# Patient Record
Sex: MALE | Race: WHITE | NOT HISPANIC OR LATINO | Employment: UNEMPLOYED | URBAN - METROPOLITAN AREA
[De-identification: names, ages, dates, MRNs, and addresses within clinical notes are randomized per-mention and may not be internally consistent; named-entity substitution may affect disease eponyms.]

---

## 2018-04-08 ENCOUNTER — HOSPITAL ENCOUNTER (INPATIENT)
Facility: HOSPITAL | Age: 33
LOS: 1 days | Discharge: HOME | DRG: 871 | End: 2018-04-09
Attending: EMERGENCY MEDICINE | Admitting: INTERNAL MEDICINE
Payer: OTHER GOVERNMENT

## 2018-04-08 DIAGNOSIS — R05.9 COUGH: ICD-10-CM

## 2018-04-08 DIAGNOSIS — J18.9 PNEUMONIA OF RIGHT LOWER LOBE DUE TO INFECTIOUS ORGANISM: Primary | ICD-10-CM

## 2018-04-08 DIAGNOSIS — A41.9 SEPSIS, DUE TO UNSPECIFIED ORGANISM: ICD-10-CM

## 2018-04-08 PROBLEM — F41.9 ANXIETY: Status: ACTIVE | Noted: 2018-04-08

## 2018-04-08 LAB
ALBUMIN SERPL BCP-MCNC: 4.3 G/DL
ALP SERPL-CCNC: 57 U/L
ALT SERPL W/O P-5'-P-CCNC: 11 U/L
ANION GAP SERPL CALC-SCNC: 14 MMOL/L
AST SERPL-CCNC: 16 U/L
BASOPHILS # BLD AUTO: 0.01 K/UL
BASOPHILS NFR BLD: 0.1 %
BILIRUB SERPL-MCNC: 2 MG/DL
BUN SERPL-MCNC: 10 MG/DL
CALCIUM SERPL-MCNC: 9.4 MG/DL
CHLORIDE SERPL-SCNC: 102 MMOL/L
CO2 SERPL-SCNC: 20 MMOL/L
CREAT SERPL-MCNC: 1.3 MG/DL
DIFFERENTIAL METHOD: ABNORMAL
EOSINOPHIL # BLD AUTO: 0.1 K/UL
EOSINOPHIL NFR BLD: 0.5 %
ERYTHROCYTE [DISTWIDTH] IN BLOOD BY AUTOMATED COUNT: 12.8 %
EST. GFR  (AFRICAN AMERICAN): >60 ML/MIN/1.73 M^2
EST. GFR  (NON AFRICAN AMERICAN): >60 ML/MIN/1.73 M^2
ETHANOL SERPL-MCNC: <10 MG/DL
GLUCOSE SERPL-MCNC: 162 MG/DL
HCT VFR BLD AUTO: 46.8 %
HGB BLD-MCNC: 16.8 G/DL
LACTATE SERPL-SCNC: 1.4 MMOL/L
LACTATE SERPL-SCNC: 3.4 MMOL/L
LYMPHOCYTES # BLD AUTO: 0.6 K/UL
LYMPHOCYTES NFR BLD: 6.6 %
MAGNESIUM SERPL-MCNC: 2.1 MG/DL
MCH RBC QN AUTO: 33 PG
MCHC RBC AUTO-ENTMCNC: 35.9 G/DL
MCV RBC AUTO: 92 FL
MONOCYTES # BLD AUTO: 0.4 K/UL
MONOCYTES NFR BLD: 4.5 %
NEUTROPHILS # BLD AUTO: 8.1 K/UL
NEUTROPHILS NFR BLD: 88.3 %
PHOSPHATE SERPL-MCNC: 1.4 MG/DL
PLATELET # BLD AUTO: 100 K/UL
PMV BLD AUTO: 10.8 FL
POTASSIUM SERPL-SCNC: 3.9 MMOL/L
PROT SERPL-MCNC: 7.7 G/DL
RBC # BLD AUTO: 5.09 M/UL
SODIUM SERPL-SCNC: 136 MMOL/L
WBC # BLD AUTO: 9.21 K/UL

## 2018-04-08 PROCEDURE — 84100 ASSAY OF PHOSPHORUS: CPT

## 2018-04-08 PROCEDURE — 83735 ASSAY OF MAGNESIUM: CPT

## 2018-04-08 PROCEDURE — 96361 HYDRATE IV INFUSION ADD-ON: CPT

## 2018-04-08 PROCEDURE — 94640 AIRWAY INHALATION TREATMENT: CPT

## 2018-04-08 PROCEDURE — 25000242 PHARM REV CODE 250 ALT 637 W/ HCPCS: Performed by: NURSE PRACTITIONER

## 2018-04-08 PROCEDURE — 96365 THER/PROPH/DIAG IV INF INIT: CPT

## 2018-04-08 PROCEDURE — 99285 EMERGENCY DEPT VISIT HI MDM: CPT | Mod: 25

## 2018-04-08 PROCEDURE — 25000003 PHARM REV CODE 250: Performed by: NURSE PRACTITIONER

## 2018-04-08 PROCEDURE — 83605 ASSAY OF LACTIC ACID: CPT | Mod: 91

## 2018-04-08 PROCEDURE — 25000003 PHARM REV CODE 250: Performed by: INTERNAL MEDICINE

## 2018-04-08 PROCEDURE — 25000003 PHARM REV CODE 250: Performed by: EMERGENCY MEDICINE

## 2018-04-08 PROCEDURE — 87040 BLOOD CULTURE FOR BACTERIA: CPT | Mod: 59

## 2018-04-08 PROCEDURE — 63600175 PHARM REV CODE 636 W HCPCS: Performed by: EMERGENCY MEDICINE

## 2018-04-08 PROCEDURE — 63600175 PHARM REV CODE 636 W HCPCS: Performed by: NURSE PRACTITIONER

## 2018-04-08 PROCEDURE — 85025 COMPLETE CBC W/AUTO DIFF WBC: CPT

## 2018-04-08 PROCEDURE — 21400001 HC TELEMETRY ROOM

## 2018-04-08 PROCEDURE — 96375 TX/PRO/DX INJ NEW DRUG ADDON: CPT

## 2018-04-08 PROCEDURE — 80053 COMPREHEN METABOLIC PANEL: CPT

## 2018-04-08 PROCEDURE — 80320 DRUG SCREEN QUANTALCOHOLS: CPT

## 2018-04-08 RX ORDER — IBUPROFEN 400 MG/1
400 TABLET ORAL EVERY 6 HOURS PRN
Status: DISCONTINUED | OUTPATIENT
Start: 2018-04-08 | End: 2018-04-09 | Stop reason: HOSPADM

## 2018-04-08 RX ORDER — ONDANSETRON 8 MG/1
8 TABLET, ORALLY DISINTEGRATING ORAL EVERY 8 HOURS PRN
Status: DISCONTINUED | OUTPATIENT
Start: 2018-04-08 | End: 2018-04-09 | Stop reason: HOSPADM

## 2018-04-08 RX ORDER — CLONAZEPAM 1 MG/1
1 TABLET ORAL 2 TIMES DAILY PRN
Status: DISCONTINUED | OUTPATIENT
Start: 2018-04-08 | End: 2018-04-09 | Stop reason: HOSPADM

## 2018-04-08 RX ORDER — IPRATROPIUM BROMIDE AND ALBUTEROL SULFATE 2.5; .5 MG/3ML; MG/3ML
3 SOLUTION RESPIRATORY (INHALATION) EVERY 4 HOURS PRN
Status: DISCONTINUED | OUTPATIENT
Start: 2018-04-08 | End: 2018-04-09 | Stop reason: HOSPADM

## 2018-04-08 RX ORDER — SODIUM CHLORIDE 0.9 % (FLUSH) 0.9 %
5 SYRINGE (ML) INJECTION
Status: DISCONTINUED | OUTPATIENT
Start: 2018-04-08 | End: 2018-04-09 | Stop reason: HOSPADM

## 2018-04-08 RX ORDER — PANTOPRAZOLE SODIUM 40 MG/1
40 TABLET, DELAYED RELEASE ORAL DAILY
Status: DISCONTINUED | OUTPATIENT
Start: 2018-04-09 | End: 2018-04-09 | Stop reason: HOSPADM

## 2018-04-08 RX ORDER — BENZONATATE 100 MG/1
100 CAPSULE ORAL 3 TIMES DAILY PRN
Status: DISCONTINUED | OUTPATIENT
Start: 2018-04-08 | End: 2018-04-09 | Stop reason: HOSPADM

## 2018-04-08 RX ORDER — ACETAMINOPHEN 325 MG/1
650 TABLET ORAL EVERY 4 HOURS PRN
Status: DISCONTINUED | OUTPATIENT
Start: 2018-04-08 | End: 2018-04-09 | Stop reason: HOSPADM

## 2018-04-08 RX ORDER — MOXIFLOXACIN HYDROCHLORIDE 400 MG/1
400 TABLET ORAL
Status: DISCONTINUED | OUTPATIENT
Start: 2018-04-08 | End: 2018-04-08

## 2018-04-08 RX ORDER — CEFTRIAXONE 1 G/1
1 INJECTION, POWDER, FOR SOLUTION INTRAMUSCULAR; INTRAVENOUS
Status: DISCONTINUED | OUTPATIENT
Start: 2018-04-08 | End: 2018-04-08

## 2018-04-08 RX ADMIN — ACETAMINOPHEN 650 MG: 325 TABLET ORAL at 09:04

## 2018-04-08 RX ADMIN — CLONAZEPAM 1 MG: 1 TABLET ORAL at 09:04

## 2018-04-08 RX ADMIN — BENZONATATE 100 MG: 100 CAPSULE ORAL at 04:04

## 2018-04-08 RX ADMIN — AZITHROMYCIN MONOHYDRATE 500 MG: 500 INJECTION, POWDER, LYOPHILIZED, FOR SOLUTION INTRAVENOUS at 03:04

## 2018-04-08 RX ADMIN — SODIUM CHLORIDE 1700 ML: 0.9 INJECTION, SOLUTION INTRAVENOUS at 02:04

## 2018-04-08 RX ADMIN — MOXIFLOXACIN HYDROCHLORIDE 400 MG: 400 TABLET, FILM COATED ORAL at 02:04

## 2018-04-08 RX ADMIN — SODIUM CHLORIDE 1000 ML: 0.9 INJECTION, SOLUTION INTRAVENOUS at 01:04

## 2018-04-08 RX ADMIN — CEFTRIAXONE SODIUM 1 G: 1 INJECTION, POWDER, FOR SOLUTION INTRAMUSCULAR; INTRAVENOUS at 04:04

## 2018-04-08 RX ADMIN — IPRATROPIUM BROMIDE AND ALBUTEROL SULFATE 3 ML: .5; 3 SOLUTION RESPIRATORY (INHALATION) at 04:04

## 2018-04-08 RX ADMIN — IBUPROFEN 400 MG: 400 TABLET, FILM COATED ORAL at 03:04

## 2018-04-08 NOTE — H&P
[facility] Medicine  History & Physical    Patient Name: Calin Araya  MRN: [id number]  Admission Date: 4/8/2018  Attending Physician: Victor Manuel Guerra MD   Primary Care Provider: Provider [employer]         Patient information was obtained from patient and ER records.     Subjective:     Principal Problem:Pneumonia of right lower lobe due to infectious organism    Chief Complaint:   Chief Complaint   Patient presents with    Headache     x 1 week. reports double vision         HPI: Calin Araya is a 32 y.o. male patient  with history of PTSD and anxiety who presents to the Emergency Department for HA which onset gradually about a week ago. Associated symptoms include cough, rhinorrhea, blurry vision, congestion, n/v, sore throat, neck pain, and generalized myalgias. Patient denies any fever, neck stiffness, photophobia, extremity weakness/numbness, dizziness, rash, and trouble swallowing. ED work-up revealed tachycardia, tachypneic, lactic acid 3.4, CXR showed right lower lobe pneumonia, CT of head negative for acute findings. In the ED, sepsis protocol was initiated including IVF's. Patient admitted to inpatient for severe sepsis 2/2 right lower lobe PNA.     Past Medical History:   Diagnosis Date    Anxiety     PTSD (post-traumatic stress disorder)     PTSD (post-traumatic stress disorder)        Past Surgical History:   Procedure Laterality Date    CYST REMOVAL      right hand       Review of patient's allergies indicates:  No Known Allergies    No current facility-administered medications on file prior to encounter.      Current Outpatient Prescriptions on File Prior to Encounter   Medication Sig    clonazePAM (KLONOPIN) 1 MG tablet Take 1 mg by mouth 2 (two) times daily as needed for Anxiety.    pantoprazole (PROTONIX) 20 MG tablet Take 1 tablet (20 mg total) by mouth once daily.    promethazine (PHENERGAN) 25 MG tablet Take 1 tablet (25 mg total) by mouth every 6 (six)  hours as needed for Nausea.    propranolol (INDERAL) 10 MG tablet Take 10 mg by mouth 3 (three) times daily.    zolpidem (AMBIEN) 10 mg Tab Take 5 mg by mouth nightly as needed.     Family History     Problem Relation (Age of Onset)    Hypertension Father        Social History Main Topics    Smoking status: Never Smoker    Smokeless tobacco: Never Used    Alcohol use Yes    Drug use: No    Sexual activity: Yes     Partners: Female     Review of Systems   Constitutional: Negative.  Negative for activity change, appetite change, chills, fatigue and fever.   HENT: Positive for congestion, rhinorrhea and sore throat. Negative for trouble swallowing.    Eyes: Positive for visual disturbance (double vision ).   Respiratory: Positive for cough. Negative for shortness of breath.    Cardiovascular: Negative.  Negative for chest pain, palpitations and leg swelling.   Gastrointestinal: Positive for nausea and vomiting. Negative for abdominal pain.   Endocrine: Negative.    Genitourinary: Negative.  Negative for dysuria, flank pain, frequency and urgency.   Musculoskeletal: Positive for myalgias and neck pain. Negative for neck stiffness.   Skin: Negative.    Allergic/Immunologic: Negative.    Neurological: Positive for headaches. Negative for dizziness, weakness, light-headedness and numbness.   Hematological: Negative.    Psychiatric/Behavioral: Negative.      Objective:     Vital Signs (Most Recent):  Temp: 98.6 °F (37 °C) (04/08/18 1500)  Pulse: 100 (04/08/18 1500)  Resp: 20 (04/08/18 1500)  BP: 123/71 (04/08/18 1500)  SpO2: 99 % (04/08/18 1500) Vital Signs (24h Range):  Temp:  [98.6 °F (37 °C)-99.3 °F (37.4 °C)] 98.6 °F (37 °C)  Pulse:  [100-122] 100  Resp:  [20-26] 20  SpO2:  [97 %-99 %] 99 %  BP: (123-143)/(60-76) 123/71     Weight: 86.2 kg (190 lb)  Body mass index is 24.39 kg/m².    Physical Exam   Constitutional: He is oriented to person, place, and time. He appears well-developed and well-nourished.   HENT:    Head: Normocephalic and atraumatic.   Eyes: EOM are normal. Pupils are equal, round, and reactive to light.   Neck: Normal range of motion. Neck supple.   Cardiovascular: Regular rhythm, normal heart sounds, intact distal pulses and normal pulses.  Tachycardia present.    No murmur heard.  Pulmonary/Chest: Effort normal. No accessory muscle usage. No respiratory distress. Decreased breath sounds to right lower lobe.  Abdominal: Soft. Normal appearance and bowel sounds are normal. There is no tenderness.   Musculoskeletal: Normal range of motion.   Neurological: He is alert and oriented to person, place, and time. He has normal reflexes.   Skin: Skin is warm, dry and intact. Capillary refill takes less than 2 seconds.   Psychiatric: He has a normal mood and affect. His speech is normal and behavior is normal. Judgment and thought content normal. Cognition and memory are normal.   Nursing note and vitals reviewed.        CRANIAL NERVES     CN III, IV, VI   Pupils are equal, round, and reactive to light.  Extraocular motions are normal.        Significant Labs:   Blood Culture: No results for input(s): LABBLOO in the last 48 hours.  BMP:   Recent Labs  Lab 04/08/18  1303   *      K 3.9      CO2 20*   BUN 10   CREATININE 1.3   CALCIUM 9.4   MG 2.1     CBC:   Recent Labs  Lab 04/08/18  1303   WBC 9.21   HGB 16.8   HCT 46.8   *     CMP:   Recent Labs  Lab 04/08/18  1303      K 3.9      CO2 20*   *   BUN 10   CREATININE 1.3   CALCIUM 9.4   PROT 7.7   ALBUMIN 4.3   BILITOT 2.0*   ALKPHOS 57   AST 16   ALT 11   ANIONGAP 14   EGFRNONAA >60     All pertinent labs within the past 24 hours have been reviewed.    Significant Imaging:   Imaging Results          CT Head Without Contrast (Final result)  Result time 04/08/18 13:35:06    Final result by Victor Manuel Chaves MD (04/08/18 13:35:06)                 Impression:     No acute findings.    All CT scans at this facility use dose  modulation, iterative reconstruction, and/or weight based dosing when appropriate to reduce radiation dose to as low as reasonably achievable.      Electronically signed by: LEEANN BRADFORD MD  Date:     04/08/18  Time:    13:35              Narrative:    Exam: Noncontrast Ct scan of the Head    History: Headache    Findings: No intracranial hemorrhage or acute findings are demonstrated. The visualized paranasal sinuses are clear. The calvarium is intact.                             X-Ray Chest PA And Lateral (Final result)  Result time 04/08/18 13:35:37    Final result by Leeann Bradford MD (04/08/18 13:35:37)                 Impression:     Right lower lobe pneumonia.      Electronically signed by: LEEANN BRADFORD MD  Date:     04/08/18  Time:    13:35              Narrative:    Exam: Two-view chest xray    History:    Cough    Findings:     The heart is normal in size.  The left lung is clear.  Right lower lobe pneumonia.                            Assessment/Plan:     * Pneumonia of right lower lobe due to infectious organism    Tachycardia, tachypneic, Lactic acid 3.4  2/2 Right lower lobe PNA   Sepsis protocol including IVF's initiated in the ED  Blood cx pending  IV Rocephin and Azithromycin   Trend Lactic acid   Duonebs prn             Anxiety    Resume Klonopin prn             VTE Risk Mitigation         Ordered     IP VTE LOW RISK PATIENT  Once      04/08/18 1557     Place sequential compression device  Until discontinued      04/08/18 1557             Heidi Malloy NP  Department of Hospital Medicine   [facility]

## 2018-04-08 NOTE — ASSESSMENT & PLAN NOTE
Tachycardia, tachypneic, Lactic acid 3.4  2/2 Right lower lobe PNA   Sepsis protocol including IVF's initiated in the ED  Blood cx pending  IV Rocephin and Azithromycin   Trend Lactic acid   Duonebs prn

## 2018-04-08 NOTE — HPI
Calin Araya is a 32 y.o. male patient with history of PTSD and anxiety who presents to the Emergency Department for HA which onset gradually about a week ago. Associated symptoms include cough, rhinorrhea, blurry vision, congestion, n/v, sore throat, neck pain, and generalized myalgias. Patient denies any fever, neck stiffness, photophobia, extremity weakness/numbness, dizziness, rash, and trouble swallowing. ED work-up revealed tachycardia, tachypneic, lactic acid 3.4, CXR showed right lower lobe pneumonia, CT of head negative for acute findings. In the ED, sepsis protocol was initiated including IVF's. Patient admitted to inpatient for severe sepsis 2/2 right lower lobe PNA.

## 2018-04-08 NOTE — SUBJECTIVE & OBJECTIVE
Past Medical History:   Diagnosis Date    Anxiety     PTSD (post-traumatic stress disorder)     PTSD (post-traumatic stress disorder)        Past Surgical History:   Procedure Laterality Date    CYST REMOVAL      right hand       Review of patient's allergies indicates:  No Known Allergies    No current facility-administered medications on file prior to encounter.      Current Outpatient Prescriptions on File Prior to Encounter   Medication Sig    clonazePAM (KLONOPIN) 1 MG tablet Take 1 mg by mouth 2 (two) times daily as needed for Anxiety.    pantoprazole (PROTONIX) 20 MG tablet Take 1 tablet (20 mg total) by mouth once daily.    promethazine (PHENERGAN) 25 MG tablet Take 1 tablet (25 mg total) by mouth every 6 (six) hours as needed for Nausea.    propranolol (INDERAL) 10 MG tablet Take 10 mg by mouth 3 (three) times daily.    zolpidem (AMBIEN) 10 mg Tab Take 5 mg by mouth nightly as needed.     Family History     Problem Relation (Age of Onset)    Hypertension Father        Social History Main Topics    Smoking status: Never Smoker    Smokeless tobacco: Never Used    Alcohol use Yes    Drug use: No    Sexual activity: Yes     Partners: Female     Review of Systems   Constitutional: Negative.  Negative for activity change, appetite change, chills, fatigue and fever.   HENT: Positive for congestion, rhinorrhea and sore throat. Negative for trouble swallowing.    Eyes: Positive for visual disturbance (double vision ).   Respiratory: Positive for cough. Negative for shortness of breath.    Cardiovascular: Negative.  Negative for chest pain, palpitations and leg swelling.   Gastrointestinal: Positive for nausea and vomiting. Negative for abdominal pain.   Endocrine: Negative.    Genitourinary: Negative.  Negative for dysuria, flank pain, frequency and urgency.   Musculoskeletal: Positive for myalgias and neck pain. Negative for neck stiffness.   Skin: Negative.    Allergic/Immunologic: Negative.     Neurological: Positive for headaches. Negative for dizziness, weakness, light-headedness and numbness.   Hematological: Negative.    Psychiatric/Behavioral: Negative.      Objective:     Vital Signs (Most Recent):  Temp: 98.6 °F (37 °C) (04/08/18 1500)  Pulse: 100 (04/08/18 1500)  Resp: 20 (04/08/18 1500)  BP: 123/71 (04/08/18 1500)  SpO2: 99 % (04/08/18 1500) Vital Signs (24h Range):  Temp:  [98.6 °F (37 °C)-99.3 °F (37.4 °C)] 98.6 °F (37 °C)  Pulse:  [100-122] 100  Resp:  [20-26] 20  SpO2:  [97 %-99 %] 99 %  BP: (123-143)/(60-76) 123/71     Weight: 86.2 kg (190 lb)  Body mass index is 24.39 kg/m².    Physical Exam   Constitutional: He is oriented to person, place, and time. He appears well-developed and well-nourished.   HENT:   Head: Normocephalic and atraumatic.   Eyes: EOM are normal. Pupils are equal, round, and reactive to light.   Neck: Normal range of motion. Neck supple.   Cardiovascular: Regular rhythm, normal heart sounds, intact distal pulses and normal pulses.  Tachycardia present.    No murmur heard.  Pulmonary/Chest: Effort normal and breath sounds normal. No accessory muscle usage. No tachypnea. No respiratory distress.   Abdominal: Soft. Normal appearance and bowel sounds are normal. There is no tenderness.   Musculoskeletal: Normal range of motion.   Neurological: He is alert and oriented to person, place, and time. He has normal reflexes.   Skin: Skin is warm, dry and intact. Capillary refill takes less than 2 seconds.   Psychiatric: He has a normal mood and affect. His speech is normal and behavior is normal. Judgment and thought content normal. Cognition and memory are normal.   Nursing note and vitals reviewed.        CRANIAL NERVES     CN III, IV, VI   Pupils are equal, round, and reactive to light.  Extraocular motions are normal.        Significant Labs:   Blood Culture: No results for input(s): LABBLOO in the last 48 hours.  BMP:   Recent Labs  Lab 04/08/18  1303   *      K  3.9      CO2 20*   BUN 10   CREATININE 1.3   CALCIUM 9.4   MG 2.1     CBC:   Recent Labs  Lab 04/08/18  1303   WBC 9.21   HGB 16.8   HCT 46.8   *     CMP:   Recent Labs  Lab 04/08/18  1303      K 3.9      CO2 20*   *   BUN 10   CREATININE 1.3   CALCIUM 9.4   PROT 7.7   ALBUMIN 4.3   BILITOT 2.0*   ALKPHOS 57   AST 16   ALT 11   ANIONGAP 14   EGFRNONAA >60     All pertinent labs within the past 24 hours have been reviewed.    Significant Imaging:   Imaging Results          CT Head Without Contrast (Final result)  Result time 04/08/18 13:35:06    Final result by Leeann Bradford MD (04/08/18 13:35:06)                 Impression:     No acute findings.    All CT scans at this facility use dose modulation, iterative reconstruction, and/or weight based dosing when appropriate to reduce radiation dose to as low as reasonably achievable.      Electronically signed by: LEEANN BRADFORD MD  Date:     04/08/18  Time:    13:35              Narrative:    Exam: Noncontrast Ct scan of the Head    History: Headache    Findings: No intracranial hemorrhage or acute findings are demonstrated. The visualized paranasal sinuses are clear. The calvarium is intact.                             X-Ray Chest PA And Lateral (Final result)  Result time 04/08/18 13:35:37    Final result by Leeann Bradford MD (04/08/18 13:35:37)                 Impression:     Right lower lobe pneumonia.      Electronically signed by: LEEANN BRADFORD MD  Date:     04/08/18  Time:    13:35              Narrative:    Exam: Two-view chest xray    History:    Cough    Findings:     The heart is normal in size.  The left lung is clear.  Right lower lobe pneumonia.

## 2018-04-08 NOTE — ED PROVIDER NOTES
SCRIBE #1 NOTE: I, Kimberly Bentley, am scribing for, and in the presence of, Frankie Calvin MD. I have scribed the entire note.      History      Chief Complaint   Patient presents with    Headache     x 1 week. reports double vision        Review of patient's allergies indicates:  No Known Allergies     HPI   HPI    4/8/2018, 12:50 PM   History obtained from the patient      History of Present Illness: Calin Araya is a 32 y.o. male patient who presents to the Emergency Department for HA which onset gradually about a week ago.  Symptoms are intermittent and moderate in severity. No mitigating or exacerbating factors reported. Pt is also complaining of cough, rhinorrhea, blurry vision, congestion, n/v, sore throat, neck pain, and generalized myalgias. Patient denies any fever, neck stiffness, photophobia, extremity weakness/numbness, dizziness, abd pain, diarrhea, dysuria, hematuria, rash, trouble swallowing, facial swelling, and all other sxs at this time. No prior tx reported. No further complaints or concerns at this time.           Arrival mode: Personal vehicle      PCP: Provider [facility]       Past Medical History:  Past Medical History:   Diagnosis Date    Anxiety     PTSD (post-traumatic stress disorder)     PTSD (post-traumatic stress disorder)        Past Surgical History:  Past Surgical History:   Procedure Laterality Date    CYST REMOVAL      right hand         Family History:  Family History   Problem Relation Age of Onset    Hypertension Father        Social History:  Social History     Social History Main Topics    Smoking status: Never Smoker    Smokeless tobacco: Never Used    Alcohol use Yes    Drug use: No    Sexual activity: Yes     Partners: Female       ROS   Review of Systems   Constitutional: Negative for fever.   HENT: Positive for congestion, rhinorrhea and sore throat. Negative for facial swelling and trouble swallowing.    Eyes: Positive for visual disturbance. Negative  for photophobia.   Respiratory: Positive for cough. Negative for shortness of breath.    Cardiovascular: Negative for chest pain.   Gastrointestinal: Positive for nausea and vomiting. Negative for abdominal pain and diarrhea.   Genitourinary: Negative for dysuria and hematuria.   Musculoskeletal: Positive for myalgias and neck pain. Negative for back pain and neck stiffness.   Skin: Negative for rash.   Neurological: Positive for headaches. Negative for dizziness, weakness and numbness.   Hematological: Does not bruise/bleed easily.   All other systems reviewed and are negative.      Physical Exam      Initial Vitals [04/08/18 1238]   BP Pulse Resp Temp SpO2   (!) 143/76 (!) 122 20 99.3 °F (37.4 °C) --      MAP       98.33          Physical Exam  Nursing Notes and Vital Signs Reviewed.  Constitutional: Patient is in no acute distress. Well-developed and well-nourished.  Head: Atraumatic. Normocephalic.  Eyes: PERRL. EOM intact. Conjunctivae are not pale. No scleral icterus.  ENT: Mucous membranes are moist. Oropharynx is clear and symmetric.    Neck: Supple. Full ROM. No lymphadenopathy. No meningismus.  Cardiovascular: Tachycardic. Regular rhythm. No murmurs, rubs, or gallops. Distal pulses are 2+ and symmetric.  Pulmonary/Chest: No respiratory distress. Clear to auscultation bilaterally. No wheezing or rales.  Abdominal: Soft and non-distended.  There is no tenderness.  No rebound, guarding, or rigidity. Good bowel sounds.  Musculoskeletal: Moves all extremities. No obvious deformities. No edema.   Skin: Warm and dry.  Neurological:  Alert, awake, and appropriate.  Normal speech.  No acute focal neurological deficits are appreciated.  Psychiatric: Normal affect. Good eye contact. Appropriate in content.    ED Course    Procedures  ED Vital Signs:  Vitals:    04/08/18 1552 04/08/18 1610 04/08/18 1646 04/08/18 1750   BP:  116/76  (!) 95/44   Pulse: 97 96 89 100   Resp:  19 18 17   Temp:    98 °F (36.7 °C)   TempSrc:  "   Oral   SpO2:  97% 98% 97%   Weight:       Height:        04/08/18 1805 04/08/18 1832 04/08/18 1902 04/08/18 2007   BP: (!) 94/47 (!) 96/50 (!) 92/51 (!) 112/58   Pulse: 100 109 82 90   Resp: 20 (!) 24 (!) 23 (!) 25   Temp:       TempSrc:       SpO2: 97% 99% 96% 99%   Weight:       Height:        04/08/18 2019 04/08/18 2350 04/09/18 0422 04/09/18 0900   BP: 114/64 116/61 119/66 127/62   Pulse: 92 82 84 71   Resp: 20 20 20 20   Temp: 98.3 °F (36.8 °C) 98.1 °F (36.7 °C) 97.8 °F (36.6 °C) 98.1 °F (36.7 °C)   TempSrc: Oral      SpO2: 96% 97% 99% 99%   Weight: 82.7 kg (182 lb 5.1 oz)  82.4 kg (181 lb 10.5 oz)    Height: 6' 2" (1.88 m)       04/09/18 1111 04/09/18 1300 04/09/18 1528   BP: (!) 116/57     Pulse: 82 92 84   Resp: 18     Temp: 98.3 °F (36.8 °C)     TempSrc: Oral     SpO2: 98%     Weight:      Height:          Abnormal Lab Results:  Labs Reviewed   CBC W/ AUTO DIFFERENTIAL - Abnormal; Notable for the following:        Result Value    MCH 33.0 (*)     Platelets 100 (*)     Gran # (ANC) 8.1 (*)     Lymph # 0.6 (*)     Gran% 88.3 (*)     Lymph% 6.6 (*)     All other components within normal limits   COMPREHENSIVE METABOLIC PANEL - Abnormal; Notable for the following:     CO2 20 (*)     Glucose 162 (*)     Total Bilirubin 2.0 (*)     All other components within normal limits   LACTIC ACID, PLASMA - Abnormal; Notable for the following:     Lactate (Lactic Acid) 3.4 (*)     All other components within normal limits   PHOSPHORUS - Abnormal; Notable for the following:     Phosphorus 1.4 (*)     All other components within normal limits   MAGNESIUM   ALCOHOL,MEDICAL (ETHANOL)   LACTIC ACID, PLASMA        All Lab Results:  Results for orders placed or performed during the hospital encounter of 04/08/18   Blood Culture #1 **CANNOT BE ORDERED STAT**   Result Value Ref Range    Blood Culture, Routine No Growth to date     Blood Culture, Routine No Growth to date    Blood Culture #2 **CANNOT BE ORDERED STAT**   Result " Value Ref Range    Blood Culture, Routine No Growth to date     Blood Culture, Routine No Growth to date    CBC auto differential   Result Value Ref Range    WBC 9.21 3.90 - 12.70 K/uL    RBC 5.09 4.60 - 6.20 M/uL    Hemoglobin 16.8 14.0 - 18.0 g/dL    Hematocrit 46.8 40.0 - 54.0 %    MCV 92 82 - 98 fL    MCH 33.0 (H) 27.0 - 31.0 pg    MCHC 35.9 32.0 - 36.0 g/dL    RDW 12.8 11.5 - 14.5 %    Platelets 100 (L) 150 - 350 K/uL    MPV 10.8 9.2 - 12.9 fL    Gran # (ANC) 8.1 (H) 1.8 - 7.7 K/uL    Lymph # 0.6 (L) 1.0 - 4.8 K/uL    Mono # 0.4 0.3 - 1.0 K/uL    Eos # 0.1 0.0 - 0.5 K/uL    Baso # 0.01 0.00 - 0.20 K/uL    Gran% 88.3 (H) 38.0 - 73.0 %    Lymph% 6.6 (L) 18.0 - 48.0 %    Mono% 4.5 4.0 - 15.0 %    Eosinophil% 0.5 0.0 - 8.0 %    Basophil% 0.1 0.0 - 1.9 %    Differential Method Automated    Comprehensive metabolic panel   Result Value Ref Range    Sodium 136 136 - 145 mmol/L    Potassium 3.9 3.5 - 5.1 mmol/L    Chloride 102 95 - 110 mmol/L    CO2 20 (L) 23 - 29 mmol/L    Glucose 162 (H) 70 - 110 mg/dL    BUN, Bld 10 6 - 20 mg/dL    Creatinine 1.3 0.5 - 1.4 mg/dL    Calcium 9.4 8.7 - 10.5 mg/dL    Total Protein 7.7 6.0 - 8.4 g/dL    Albumin 4.3 3.5 - 5.2 g/dL    Total Bilirubin 2.0 (H) 0.1 - 1.0 mg/dL    Alkaline Phosphatase 57 55 - 135 U/L    AST 16 10 - 40 U/L    ALT 11 10 - 44 U/L    Anion Gap 14 8 - 16 mmol/L    eGFR if African American >60 >60 mL/min/1.73 m^2    eGFR if non African American >60 >60 mL/min/1.73 m^2   Lactic acid, plasma   Result Value Ref Range    Lactate (Lactic Acid) 3.4 (H) 0.5 - 2.2 mmol/L   Magnesium   Result Value Ref Range    Magnesium 2.1 1.6 - 2.6 mg/dL   Phosphorus   Result Value Ref Range    Phosphorus 1.4 (L) 2.7 - 4.5 mg/dL   Ethanol   Result Value Ref Range    Alcohol, Medical, Serum <10 <10 mg/dL   Lactic acid, plasma   Result Value Ref Range    Lactate (Lactic Acid) 1.4 0.5 - 2.2 mmol/L   Basic Metabolic Panel (BMP)   Result Value Ref Range    Sodium 142 136 - 145 mmol/L     Potassium 3.6 3.5 - 5.1 mmol/L    Chloride 108 95 - 110 mmol/L    CO2 26 23 - 29 mmol/L    Glucose 94 70 - 110 mg/dL    BUN, Bld 10 6 - 20 mg/dL    Creatinine 1.1 0.5 - 1.4 mg/dL    Calcium 8.7 8.7 - 10.5 mg/dL    Anion Gap 8 8 - 16 mmol/L    eGFR if African American >60 >60 mL/min/1.73 m^2    eGFR if non African American >60 >60 mL/min/1.73 m^2   CBC auto differential   Result Value Ref Range    WBC 7.91 3.90 - 12.70 K/uL    RBC 4.02 (L) 4.60 - 6.20 M/uL    Hemoglobin 13.0 (L) 14.0 - 18.0 g/dL    Hematocrit 38.2 (L) 40.0 - 54.0 %    MCV 95 82 - 98 fL    MCH 32.3 (H) 27.0 - 31.0 pg    MCHC 34.0 32.0 - 36.0 g/dL    RDW 12.4 11.5 - 14.5 %    Platelets 108 (L) 150 - 350 K/uL    MPV 11.6 9.2 - 12.9 fL    Gran # (ANC) 4.8 1.8 - 7.7 K/uL    Lymph # 2.3 1.0 - 4.8 K/uL    Mono # 0.6 0.3 - 1.0 K/uL    Eos # 0.3 0.0 - 0.5 K/uL    Baso # 0.01 0.00 - 0.20 K/uL    Gran% 60.5 38.0 - 73.0 %    Lymph% 28.4 18.0 - 48.0 %    Mono% 7.3 4.0 - 15.0 %    Eosinophil% 3.7 0.0 - 8.0 %    Basophil% 0.1 0.0 - 1.9 %    Differential Method Automated    Rapid HIV   Result Value Ref Range    HIV Rapid Testing Negative Negative         Imaging Results:  Imaging Results          CT Head Without Contrast (Final result)  Result time 04/08/18 13:35:06    Final result by Leeann Bradford MD (04/08/18 13:35:06)                 Impression:     No acute findings.    All CT scans at this facility use dose modulation, iterative reconstruction, and/or weight based dosing when appropriate to reduce radiation dose to as low as reasonably achievable.      Electronically signed by: LEEANN BRADFORD MD  Date:     04/08/18  Time:    13:35              Narrative:    Exam: Noncontrast Ct scan of the Head    History: Headache    Findings: No intracranial hemorrhage or acute findings are demonstrated. The visualized paranasal sinuses are clear. The calvarium is intact.                             X-Ray Chest PA And Lateral (Final result)  Result time 04/08/18 13:35:37    Final  result by Leeann Bradford MD (04/08/18 13:35:37)                 Impression:     Right lower lobe pneumonia.      Electronically signed by: LEEANN BRADFORD MD  Date:     04/08/18  Time:    13:35              Narrative:    Exam: Two-view chest xray    History:    Cough    Findings:     The heart is normal in size.  The left lung is clear.  Right lower lobe pneumonia.                                      The Emergency Provider reviewed the vital signs and test results, which are outlined above.    ED Discussion     1:50 PM: Re-evaluated pt. Pt is resting comfortably and is in no acute distress.  D/w pt all pertinent results. D/w pt any concerns expressed at this time. Answered all questions. Pt expresses understanding at this time.    2:37 PM: Re-evaluated pt. I have discussed test results, shared treatment plan, and the need for admission with patient and family at bedside. Pt and family express understanding at this time and agree with all information. All questions answered. Pt and family have no further questions or concerns at this time. Pt is ready for admit.    2:43 PM: Discussed case with Heidi Malloy NP   (Hospital Medicine). Dr. Guerra agrees with current care and management of pt and accepts admission.   Admitting Service: Hospital medicine   Admitting Physician: Leeann Guerra MD  Admit to: Tele        ED Medication(s):  Medications   sodium chloride 0.9% bolus 1,000 mL (0 mLs Intravenous Stopped 4/8/18 1418)   sodium chloride 0.9% bolus 1,700 mL (0 mLs Intravenous Stopped 4/8/18 1622)   potassium phosphate 15 mmol in dextrose 5 % 250 mL infusion (15 mmol Intravenous New Bag 4/9/18 1207)       Discharge Medication List as of 4/9/2018  2:13 PM      START taking these medications    Details   levoFLOXacin (LEVAQUIN) 750 MG tablet Take 1 tablet (750 mg total) by mouth once daily., Starting Mon 4/9/2018, Print             Follow-up Information     Provider [facility] In 1 week.                   Medical Decision  Making    Medical Decision Making:   Clinical Tests:   Lab Tests: Ordered and Reviewed  Radiological Study: Ordered and Reviewed           Scribe Attestation:   Scribe #1: I performed the above scribed service and the documentation accurately describes the services I performed. I attest to the accuracy of the note.    Attending:   Physician Attestation Statement for Scribe #1: I, Frankie Calvin MD, personally performed the services described in this documentation, as scribed by Kimberly Bentley, in my presence, and it is both accurate and complete.          Clinical Impression       ICD-10-CM ICD-9-CM   1. Pneumonia of right lower lobe due to infectious organism J18.1 486   2. Cough R05 786.2   3. Sepsis, due to unspecified organism A41.9 038.9     995.91       Disposition:   Disposition: Admitted  Condition: Fair         Frankie Calvin MD  04/09/18 2051

## 2018-04-08 NOTE — ED NOTES
Pt resting in bed with eyes closed and girlfriend next to him. Pt in no apparent distress at this time

## 2018-04-09 VITALS
WEIGHT: 181.69 LBS | SYSTOLIC BLOOD PRESSURE: 116 MMHG | DIASTOLIC BLOOD PRESSURE: 57 MMHG | OXYGEN SATURATION: 98 % | HEIGHT: 74 IN | HEART RATE: 84 BPM | BODY MASS INDEX: 23.32 KG/M2 | RESPIRATION RATE: 18 BRPM | TEMPERATURE: 98 F

## 2018-04-09 PROBLEM — D69.6 THROMBOCYTOPENIA, UNSPECIFIED: Status: ACTIVE | Noted: 2018-04-09

## 2018-04-09 PROBLEM — E83.39 HYPOPHOSPHATEMIA: Status: ACTIVE | Noted: 2018-04-09

## 2018-04-09 LAB
ANION GAP SERPL CALC-SCNC: 8 MMOL/L
BASOPHILS # BLD AUTO: 0.01 K/UL
BASOPHILS NFR BLD: 0.1 %
BUN SERPL-MCNC: 10 MG/DL
CALCIUM SERPL-MCNC: 8.7 MG/DL
CHLORIDE SERPL-SCNC: 108 MMOL/L
CO2 SERPL-SCNC: 26 MMOL/L
CREAT SERPL-MCNC: 1.1 MG/DL
DIFFERENTIAL METHOD: ABNORMAL
EOSINOPHIL # BLD AUTO: 0.3 K/UL
EOSINOPHIL NFR BLD: 3.7 %
ERYTHROCYTE [DISTWIDTH] IN BLOOD BY AUTOMATED COUNT: 12.4 %
EST. GFR  (AFRICAN AMERICAN): >60 ML/MIN/1.73 M^2
EST. GFR  (NON AFRICAN AMERICAN): >60 ML/MIN/1.73 M^2
GLUCOSE SERPL-MCNC: 94 MG/DL
HCT VFR BLD AUTO: 38.2 %
HGB BLD-MCNC: 13 G/DL
HIV1+2 IGG SERPL QL IA.RAPID: NEGATIVE
LYMPHOCYTES # BLD AUTO: 2.3 K/UL
LYMPHOCYTES NFR BLD: 28.4 %
MCH RBC QN AUTO: 32.3 PG
MCHC RBC AUTO-ENTMCNC: 34 G/DL
MCV RBC AUTO: 95 FL
MONOCYTES # BLD AUTO: 0.6 K/UL
MONOCYTES NFR BLD: 7.3 %
NEUTROPHILS # BLD AUTO: 4.8 K/UL
NEUTROPHILS NFR BLD: 60.5 %
PLATELET # BLD AUTO: 108 K/UL
PMV BLD AUTO: 11.6 FL
POTASSIUM SERPL-SCNC: 3.6 MMOL/L
RBC # BLD AUTO: 4.02 M/UL
SODIUM SERPL-SCNC: 142 MMOL/L
WBC # BLD AUTO: 7.91 K/UL

## 2018-04-09 PROCEDURE — 85025 COMPLETE CBC W/AUTO DIFF WBC: CPT

## 2018-04-09 PROCEDURE — 36415 COLL VENOUS BLD VENIPUNCTURE: CPT

## 2018-04-09 PROCEDURE — 25000003 PHARM REV CODE 250: Performed by: INTERNAL MEDICINE

## 2018-04-09 PROCEDURE — 25000003 PHARM REV CODE 250: Performed by: NURSE PRACTITIONER

## 2018-04-09 PROCEDURE — 86703 HIV-1/HIV-2 1 RESULT ANTBDY: CPT

## 2018-04-09 PROCEDURE — 80048 BASIC METABOLIC PNL TOTAL CA: CPT

## 2018-04-09 RX ORDER — LEVOFLOXACIN 750 MG/1
750 TABLET ORAL DAILY
Qty: 10 TABLET | Refills: 0 | Status: SHIPPED | OUTPATIENT
Start: 2018-04-09 | End: 2019-05-06 | Stop reason: ALTCHOICE

## 2018-04-09 RX ORDER — MOXIFLOXACIN HYDROCHLORIDE 400 MG/1
400 TABLET ORAL DAILY
Status: DISCONTINUED | OUTPATIENT
Start: 2018-04-09 | End: 2018-04-09 | Stop reason: HOSPADM

## 2018-04-09 RX ADMIN — MOXIFLOXACIN HYDROCHLORIDE 400 MG: 400 TABLET, FILM COATED ORAL at 02:04

## 2018-04-09 RX ADMIN — BENZONATATE 100 MG: 100 CAPSULE ORAL at 05:04

## 2018-04-09 RX ADMIN — PANTOPRAZOLE SODIUM 40 MG: 40 TABLET, DELAYED RELEASE ORAL at 08:04

## 2018-04-09 RX ADMIN — POTASSIUM PHOSPHATE, MONOBASIC AND POTASSIUM PHOSPHATE, DIBASIC 15 MMOL: 224; 236 INJECTION, SOLUTION, CONCENTRATE INTRAVENOUS at 12:04

## 2018-04-09 NOTE — NURSING
Plan of care reviewed. Patient discharged. Verbalized understanding. No further questions at this time. IV Potassium phospate complete. Printed prescription provided. IV and cardiac monitor removed. Wheeled down via wheelchair

## 2018-04-09 NOTE — PLAN OF CARE
Problem: Patient Care Overview  Goal: Plan of Care Review  Outcome: Ongoing (interventions implemented as appropriate)  Pt and significant other oriented to room/unit and hourly rounding process. POC reviewed. Verbalizes understanding. C/o headache and generalized aches managed with prn Tylenol. VSS, afebrile. NSR on the monitor. Klonopin administered as ordered prn for anxiety. Chart reviewed. Will monitor.

## 2018-04-09 NOTE — DISCHARGE SUMMARY
[facility] Hospital Medicine  Discharge Summary      Patient Name: Calin Araya  MRN: [id number]  Admission Date: 4/8/2018  Hospital Length of Stay: 1 days  Discharge Date and Time:  04/09/2018 11:23 AM  Attending Physician: Victor Manuel Guerra MD   Discharging Provider: Victor Manuel Guerra MD  Primary Care Provider: Provider [facility]      HPI:   Calin Araya is a 32 y.o. male patient  with history of PTSD and anxiety who presents to the Emergency Department for HA which onset gradually about a week ago. Associated symptoms include cough, rhinorrhea, blurry vision, congestion, n/v, sore throat, neck pain, and generalized myalgias. Patient denies any fever, neck stiffness, photophobia, extremity weakness/numbness, dizziness, rash, and trouble swallowing. ED work-up revealed tachycardia, tachypneic, lactic acid 3.4, CXR showed right lower lobe pneumonia, CT of head negative for acute findings. In the ED, sepsis protocol was initiated including IVF's. Patient admitted to inpatient for severe sepsis 2/2 right lower lobe PNA.     * No surgery found *      Hospital Course:   32 year old man admitted with history of  PTSD and anxiety who presents to the Emergency Department with history of headache , cough, rhinorrhea and generalized myalgias.. ED work-up revealed tachycardia, tachypneic, lactic acid 3.4, CXR showed right lower lobe pneumonia. Cbc showed normal wbc with left shift . He was started on IV Rocephin and Zithromax and serum lactate has returned to normal . Head CT scan did not show any acute lesion. HIV test was done and results is pending at this time and will be known prior to discharge. Lab review was significant for hypophosphatemia which was corrected and also for thrombocytopenia which needs to monitored on discharge.   At this time, he feels better and will discharge to continue supportive care at home. He will be discharged with prescription of 10days of Levaquin.     Consults:    Consults         Status Ordering Provider     Inpatient consult to Hospitalist  Once     Provider:  Heidi Malloy NP    Acknowledged PABLITO SANCHEZ          No new Assessment & Plan notes have been filed under this hospital service since the last note was generated.  Service: Hospital Medicine    Final Active Diagnoses:    Diagnosis Date Noted POA    PRINCIPAL PROBLEM:  Pneumonia of right lower lobe due to infectious organism [J18.1] 04/08/2018 Yes    Hypophosphatemia [E83.39] 04/09/2018 Yes    Thrombocytopenia, unspecified [D69.6] 04/09/2018 Yes    Anxiety [F41.9] 04/08/2018 Yes      Problems Resolved During this Admission:    Diagnosis Date Noted Date Resolved POA       Discharged Condition: stable    Disposition:     Follow Up:  Follow-up Information     Provider [facility] In 1 week.               Patient Instructions:   No discharge procedures on file.    Significant Diagnostic Studies: Labs:   CBC   Recent Labs  Lab 04/08/18  1303 04/09/18  0459   WBC 9.21 7.91   HGB 16.8 13.0*   HCT 46.8 38.2*   * 108*       Pending Diagnostic Studies:     Procedure Component Value Units Date/Time    Rapid HIV [[id number]] Collected:  04/09/18 0459    Order Status:  Sent Lab Status:  In process Updated:  04/09/18 1105    Specimen:  Blood from Blood     Narrative:       Ok to add on clare Pizarro 11:04 4/09/18         Medications:  Reconciled Home Medications:      Medication List      START taking these medications    levoFLOXacin 750 MG tablet  Commonly known as:  LEVAQUIN  Take 1 tablet (750 mg total) by mouth once daily.        CONTINUE taking these medications    KlonoPIN 1 MG tablet  Generic drug:  clonazePAM        STOP taking these medications    pantoprazole 20 MG tablet  Commonly known as:  PROTONIX           Where to Get Your Medications      You can get these medications from any pharmacy    Bring a paper prescription for each of these medications  · levoFLOXacin 750 MG tablet         Indwelling  Lines/Drains at time of discharge:   Lines/Drains/Airways          No matching active lines, drains, or airways          Time spent on the discharge of patient: 45 minutes  Patient was seen and examined on the date of discharge and determined to be suitable for discharge.         Victor Manuel Guerra MD  Department of Hospital Medicine  [facility]

## 2018-04-09 NOTE — HOSPITAL COURSE
32 year old man admitted with history of  PTSD and anxiety who presents to the Emergency Department with history of headache , cough, rhinorrhea and generalized myalgias.. ED work-up revealed tachycardia, tachypneic, lactic acid 3.4, CXR showed right lower lobe pneumonia. Cbc showed normal wbc with left shift . He was started on IV Rocephin and Zithromax and serum lactate has returned to normal . Head CT scan did not show any acute lesion. HIV test was done and results is pending at this time and will be known prior to discharge. Lab review was significant for hypophosphatemia which was corrected and also for thrombocytopenia which needs to monitored on discharge.   At this time, he feels better and will discharge to continue supportive care at home. He will be discharged with prescription of 10days of Levaquin.

## 2018-04-09 NOTE — PLAN OF CARE
Problem: Patient Care Overview  Goal: Plan of Care Review  Outcome: Outcome(s) achieved Date Met: 04/09/18  Plan of care reviewed with patient. Patient stable. Aaox3. Patient free from falls fall precautions in place. Assist x 1 to the bathroom. Call be;; and belongings with in reach reminded to call for assistance. No complaints of pain at this time. Turn independently in bed. NSR on monitor. Will cont to monitor.

## 2018-04-09 NOTE — PLAN OF CARE
"Initial assessment completed. Met with patient. Patient denies any post hospital needs or services at this time. Patient will be discharging [facility] but will not be able to  it until tomorrow. Discussed with Dr Guerra who will give him a po dose prior to discharge. Also discussed with Moira primary nurse. Patient also stated that his iv was burning, " off and on" . Also discussed with Moira primary nurse.  Transitional Care Folder, Discharge Planning Begins on Admission pamphlet, [facility] Delivery pamphlet, Advance Directive information given to patient along with  contact information. Instructed patient or family to call with any questions or concerns.       04/09/18 1220   Discharge Assessment   Assessment Type Discharge Planning Assessment   Confirmed/corrected address and phone number on facesheet? Yes   Assessment information obtained from? Patient;Medical Record   Expected Length of Stay (days) (dc today)   Communicated expected length of stay with patient/caregiver yes   Prior to hospitilization cognitive status: Alert/Oriented   Prior to hospitalization functional status: Independent   Current cognitive status: Alert/Oriented   Current Functional Status: Independent   Facility Arrived From: home   Lives With child(brian), dependent   Able to Return to Prior Arrangements yes   Is patient able to care for self after discharge? Yes   Who are your caregiver(s) and their phone number(s)? Anahi Araya ( ex-wife ) [phone]   Patient's perception of discharge disposition home or selfcare   Readmission Within The Last 30 Days no previous admission in last 30 days   Patient currently being followed by outpatient case management? Yes   If yes, name of outpatient case management following: other (comments)  ([facility])   Patient currently receives any other outside agency services? No   Equipment Currently " Used at Home none   Do you have any problems affording any of your prescribed medications? No   Is the patient taking medications as prescribed? yes   Does the patient have transportation home? Yes   Transportation Available family or friend will provide   Does the patient receive services at the [facility]? No   Discharge Plan A Home;Home with family   Discharge Plan B Home   Patient/Family In Agreement With Plan yes

## 2018-04-10 NOTE — PLAN OF CARE
04/10/18 0811   Final Note   Assessment Type Final Discharge Note   Discharge Disposition Home

## 2018-04-13 LAB
BACTERIA BLD CULT: NORMAL
BACTERIA BLD CULT: NORMAL

## 2019-05-06 ENCOUNTER — HOSPITAL ENCOUNTER (EMERGENCY)
Facility: HOSPITAL | Age: 34
Discharge: HOME | End: 2019-05-06
Attending: FAMILY MEDICINE
Payer: OTHER GOVERNMENT

## 2019-05-06 VITALS
TEMPERATURE: 98 F | HEIGHT: 74 IN | HEART RATE: 64 BPM | WEIGHT: 187.75 LBS | RESPIRATION RATE: 18 BRPM | BODY MASS INDEX: 24.09 KG/M2 | OXYGEN SATURATION: 99 % | SYSTOLIC BLOOD PRESSURE: 117 MMHG | DIASTOLIC BLOOD PRESSURE: 61 MMHG

## 2019-05-06 DIAGNOSIS — M25.529 ELBOW PAIN: ICD-10-CM

## 2019-05-06 DIAGNOSIS — M79.602 LEFT ARM PAIN: Primary | ICD-10-CM

## 2019-05-06 LAB
ALBUMIN SERPL BCP-MCNC: 4 G/DL (ref 3.5–5.2)
ALP SERPL-CCNC: 51 U/L (ref 55–135)
ALT SERPL W/O P-5'-P-CCNC: 110 U/L (ref 10–44)
ANION GAP SERPL CALC-SCNC: 11 MMOL/L (ref 8–16)
AST SERPL-CCNC: 101 U/L (ref 10–40)
BASOPHILS # BLD AUTO: 0.02 K/UL (ref 0–0.2)
BASOPHILS NFR BLD: 0.3 % (ref 0–1.9)
BILIRUB SERPL-MCNC: 0.5 MG/DL (ref 0.1–1)
BILIRUB UR QL STRIP: NEGATIVE
BNP SERPL-MCNC: <10 PG/ML (ref 0–99)
BUN SERPL-MCNC: 18 MG/DL (ref 6–20)
CALCIUM SERPL-MCNC: 9.5 MG/DL (ref 8.7–10.5)
CHLORIDE SERPL-SCNC: 104 MMOL/L (ref 95–110)
CK SERPL-CCNC: 2117 U/L (ref 20–200)
CLARITY UR: CLEAR
CO2 SERPL-SCNC: 24 MMOL/L (ref 23–29)
COLOR UR: YELLOW
CREAT SERPL-MCNC: 1.1 MG/DL (ref 0.5–1.4)
CRP SERPL-MCNC: 2 MG/L (ref 0–8.2)
DIFFERENTIAL METHOD: ABNORMAL
EOSINOPHIL # BLD AUTO: 0.4 K/UL (ref 0–0.5)
EOSINOPHIL NFR BLD: 4.8 % (ref 0–8)
ERYTHROCYTE [DISTWIDTH] IN BLOOD BY AUTOMATED COUNT: 12.8 % (ref 11.5–14.5)
ERYTHROCYTE [SEDIMENTATION RATE] IN BLOOD BY WESTERGREN METHOD: 3 MM/HR (ref 0–10)
EST. GFR  (AFRICAN AMERICAN): >60 ML/MIN/1.73 M^2
EST. GFR  (NON AFRICAN AMERICAN): >60 ML/MIN/1.73 M^2
GLUCOSE SERPL-MCNC: 86 MG/DL (ref 70–110)
GLUCOSE UR QL STRIP: NEGATIVE
HCT VFR BLD AUTO: 41.8 % (ref 40–54)
HGB BLD-MCNC: 14.5 G/DL (ref 14–18)
HGB UR QL STRIP: NEGATIVE
KETONES UR QL STRIP: NEGATIVE
LEUKOCYTE ESTERASE UR QL STRIP: NEGATIVE
LYMPHOCYTES # BLD AUTO: 2.6 K/UL (ref 1–4.8)
LYMPHOCYTES NFR BLD: 36.3 % (ref 18–48)
MCH RBC QN AUTO: 31.5 PG (ref 27–31)
MCHC RBC AUTO-ENTMCNC: 34.7 G/DL (ref 32–36)
MCV RBC AUTO: 91 FL (ref 82–98)
MONOCYTES # BLD AUTO: 0.3 K/UL (ref 0.3–1)
MONOCYTES NFR BLD: 4.4 % (ref 4–15)
NEUTROPHILS # BLD AUTO: 3.9 K/UL (ref 1.8–7.7)
NEUTROPHILS NFR BLD: 54.2 % (ref 38–73)
NITRITE UR QL STRIP: NEGATIVE
PH UR STRIP: 7 [PH] (ref 5–8)
PLATELET # BLD AUTO: 139 K/UL (ref 150–350)
PMV BLD AUTO: 11.6 FL (ref 9.2–12.9)
POTASSIUM SERPL-SCNC: 3.7 MMOL/L (ref 3.5–5.1)
PROT SERPL-MCNC: 6.8 G/DL (ref 6–8.4)
PROT UR QL STRIP: NEGATIVE
RBC # BLD AUTO: 4.6 M/UL (ref 4.6–6.2)
SODIUM SERPL-SCNC: 139 MMOL/L (ref 136–145)
SP GR UR STRIP: 1.02 (ref 1–1.03)
TROPONIN I SERPL DL<=0.01 NG/ML-MCNC: <0.006 NG/ML (ref 0–0.03)
URATE SERPL-MCNC: 6.4 MG/DL (ref 3.4–7)
URN SPEC COLLECT METH UR: NORMAL
UROBILINOGEN UR STRIP-ACNC: 1 EU/DL
WBC # BLD AUTO: 7.27 K/UL (ref 3.9–12.7)

## 2019-05-06 PROCEDURE — 36415 COLL VENOUS BLD VENIPUNCTURE: CPT

## 2019-05-06 PROCEDURE — 96361 HYDRATE IV INFUSION ADD-ON: CPT

## 2019-05-06 PROCEDURE — 99285 EMERGENCY DEPT VISIT HI MDM: CPT | Mod: 25

## 2019-05-06 PROCEDURE — 84484 ASSAY OF TROPONIN QUANT: CPT

## 2019-05-06 PROCEDURE — 93010 ELECTROCARDIOGRAM REPORT: CPT | Mod: ,,, | Performed by: INTERNAL MEDICINE

## 2019-05-06 PROCEDURE — 96374 THER/PROPH/DIAG INJ IV PUSH: CPT

## 2019-05-06 PROCEDURE — 93005 ELECTROCARDIOGRAM TRACING: CPT

## 2019-05-06 PROCEDURE — 85025 COMPLETE CBC W/AUTO DIFF WBC: CPT

## 2019-05-06 PROCEDURE — 84550 ASSAY OF BLOOD/URIC ACID: CPT

## 2019-05-06 PROCEDURE — 82550 ASSAY OF CK (CPK): CPT

## 2019-05-06 PROCEDURE — 80053 COMPREHEN METABOLIC PANEL: CPT

## 2019-05-06 PROCEDURE — 85651 RBC SED RATE NONAUTOMATED: CPT

## 2019-05-06 PROCEDURE — 63600175 PHARM REV CODE 636 W HCPCS: Performed by: FAMILY MEDICINE

## 2019-05-06 PROCEDURE — 93010 EKG 12-LEAD: ICD-10-PCS | Mod: ,,, | Performed by: INTERNAL MEDICINE

## 2019-05-06 PROCEDURE — 25000003 PHARM REV CODE 250: Performed by: FAMILY MEDICINE

## 2019-05-06 PROCEDURE — 81003 URINALYSIS AUTO W/O SCOPE: CPT

## 2019-05-06 PROCEDURE — 86140 C-REACTIVE PROTEIN: CPT

## 2019-05-06 PROCEDURE — 83880 ASSAY OF NATRIURETIC PEPTIDE: CPT

## 2019-05-06 RX ORDER — ZOLPIDEM TARTRATE 10 MG/1
5 TABLET ORAL NIGHTLY PRN
COMMUNITY

## 2019-05-06 RX ORDER — KETOROLAC TROMETHAMINE 30 MG/ML
30 INJECTION, SOLUTION INTRAMUSCULAR; INTRAVENOUS
Status: COMPLETED | OUTPATIENT
Start: 2019-05-06 | End: 2019-05-06

## 2019-05-06 RX ORDER — DEXTROAMPHETAMINE SACCHARATE, AMPHETAMINE ASPARTATE MONOHYDRATE, DEXTROAMPHETAMINE SULFATE AND AMPHETAMINE SULFATE 5; 5; 5; 5 MG/1; MG/1; MG/1; MG/1
20 CAPSULE, EXTENDED RELEASE ORAL EVERY MORNING
COMMUNITY

## 2019-05-06 RX ORDER — CYCLOBENZAPRINE HCL 10 MG
10 TABLET ORAL 3 TIMES DAILY PRN
Qty: 15 TABLET | Refills: 0 | Status: SHIPPED | OUTPATIENT
Start: 2019-05-06 | End: 2019-05-11

## 2019-05-06 RX ORDER — MELOXICAM 15 MG/1
15 TABLET ORAL DAILY
Qty: 20 TABLET | Refills: 0 | Status: ON HOLD | OUTPATIENT
Start: 2019-05-06 | End: 2023-02-17 | Stop reason: HOSPADM

## 2019-05-06 RX ADMIN — SODIUM CHLORIDE 1000 ML: 0.9 INJECTION, SOLUTION INTRAVENOUS at 02:05

## 2019-05-06 RX ADMIN — KETOROLAC TROMETHAMINE 30 MG: 30 INJECTION, SOLUTION INTRAMUSCULAR; INTRAVENOUS at 02:05

## 2019-05-06 NOTE — ED PROVIDER NOTES
SCRIBE #1 NOTE: I, Jd Arias, am scribing for, and in the presence of, Renetta Coelho MD. I have scribed the entire note.      History      Chief Complaint   Patient presents with    Arm Swelling     L arm swelling, onset last night. L hand and fingers feel like pins and needles.        Review of patient's allergies indicates:  No Known Allergies     HPI   HPI    5/6/2019, 1:10 AM   History obtained from the patient      History of Present Illness: Calin Araya is a 34 y.o. male patient who presents to the Emergency Department for LUE swelling, onset last night. Symptoms are constant and moderate in severity. No mitigating or exacerbating factors reported. Associated sxs include LUE numbness. Patient denies any fever, chills, n/v, SOB, CP, trauma, rash, weakness, and all other sxs at this time. Prior Tx includes topical ointment, with no improvement. No further complaints or concerns at this time.     Arrival mode: Personal vehicle    PCP: [facility]       Past Medical History:  Past Medical History:   Diagnosis Date    ADD (attention deficit disorder)     Anxiety     Insomnia     PTSD (post-traumatic stress disorder)     PTSD (post-traumatic stress disorder)        Past Surgical History:  Past Surgical History:   Procedure Laterality Date    CYST REMOVAL      right hand         Family History:  Family History   Problem Relation Age of Onset    Hypertension Father        Social History:  Social History     Tobacco Use    Smoking status: Never Smoker    Smokeless tobacco: Never Used   Substance and Sexual Activity    Alcohol use: Yes    Drug use: No    Sexual activity: Yes     Partners: Female       ROS   Review of Systems   Constitutional: Negative for chills, diaphoresis and fever.   HENT: Negative for sore throat.    Respiratory: Negative for chest tightness and shortness of breath.    Cardiovascular: Negative for chest pain.   Gastrointestinal: Negative for abdominal pain, nausea  and vomiting.   Genitourinary: Negative for dysuria.   Musculoskeletal: Negative for back pain.        (+) LUE swelling     Skin: Negative for rash and wound.   Neurological: Positive for numbness (LUE). Negative for dizziness, syncope, weakness, light-headedness and headaches.   Hematological: Does not bruise/bleed easily.   All other systems reviewed and are negative.    Physical Exam      Initial Vitals [05/06/19 0058]   BP Pulse Resp Temp SpO2   (!) 153/79 79 18 98 °F (36.7 °C) 98 %      MAP       --          Physical Exam  Nursing Notes and Vital Signs Reviewed.  Constitutional: Patient is in no acute distress. Well-developed and well-nourished.  Head: Atraumatic. Normocephalic.  Eyes: PERRL. EOM intact. Conjunctivae are not pale. No scleral icterus.  ENT: Mucous membranes are moist. Oropharynx is clear and symmetric.    Neck: Supple. Full ROM. No lymphadenopathy.  Cardiovascular: Regular rate. Regular rhythm. No murmurs, rubs, or gallops. Distal pulses are 2+ and symmetric.  Pulmonary/Chest: No respiratory distress. Clear to auscultation bilaterally. No wheezing or rales.  Abdominal: Soft and non-distended. There is no abdominal tenderness.  No rebound, guarding, or rigidity. Good bowel sounds.  Musculoskeletal: Moves all extremities. No obvious deformities. No calf tenderness.  LUE: Has no evident deformity. Mild diffuse swelling present. Negative for tenderness. ROM is slightly limited secondary to swelling. Cap refill distally is <2 seconds. Radial pulses are equal and 2+ bilaterally. NVI distally.   Skin: Warm and dry.  Neurological:  Alert, awake, and appropriate.  Normal speech.  No acute focal neurological deficits are appreciated.  Psychiatric: Normal affect. Good eye contact. Appropriate in content.    ED Course    Procedures  ED Vital Signs:  Vitals:    05/06/19 0058 05/06/19 0258 05/06/19 0331   BP: (!) 153/79 125/64 117/61   Pulse: 79 61 64   Resp: 18 18 18   Temp: 98 °F (36.7 °C)  97.9 °F (36.6  "°C)   TempSrc: Oral  Oral   SpO2: 98% 100% 99%   Weight: 85.2 kg (187 lb 11.6 oz)     Height: 6' 2" (1.88 m)         Abnormal Lab Results:  Labs Reviewed   CBC W/ AUTO DIFFERENTIAL - Abnormal; Notable for the following components:       Result Value    Mean Corpuscular Hemoglobin 31.5 (*)     Platelets 139 (*)     All other components within normal limits   COMPREHENSIVE METABOLIC PANEL - Abnormal; Notable for the following components:    Alkaline Phosphatase 51 (*)      (*)      (*)     All other components within normal limits   CK - Abnormal; Notable for the following components:    CPK 2117 (*)     All other components within normal limits   URINALYSIS   SEDIMENTATION RATE   C-REACTIVE PROTEIN   URIC ACID   CK   TROPONIN I   B-TYPE NATRIURETIC PEPTIDE   TROPONIN I   B-TYPE NATRIURETIC PEPTIDE        All Lab Results:  Results for orders placed or performed during the hospital encounter of 05/06/19   CBC auto differential   Result Value Ref Range    WBC 7.27 3.90 - 12.70 K/uL    RBC 4.60 4.60 - 6.20 M/uL    Hemoglobin 14.5 14.0 - 18.0 g/dL    Hematocrit 41.8 40.0 - 54.0 %    Mean Corpuscular Volume 91 82 - 98 fL    Mean Corpuscular Hemoglobin 31.5 (H) 27.0 - 31.0 pg    Mean Corpuscular Hemoglobin Conc 34.7 32.0 - 36.0 g/dL    RDW 12.8 11.5 - 14.5 %    Platelets 139 (L) 150 - 350 K/uL    MPV 11.6 9.2 - 12.9 fL    Gran # (ANC) 3.9 1.8 - 7.7 K/uL    Lymph # 2.6 1.0 - 4.8 K/uL    Mono # 0.3 0.3 - 1.0 K/uL    Eos # 0.4 0.0 - 0.5 K/uL    Baso # 0.02 0.00 - 0.20 K/uL    Gran% 54.2 38.0 - 73.0 %    Lymph% 36.3 18.0 - 48.0 %    Mono% 4.4 4.0 - 15.0 %    Eosinophil% 4.8 0.0 - 8.0 %    Basophil% 0.3 0.0 - 1.9 %    Differential Method Automated    Comprehensive metabolic panel   Result Value Ref Range    Sodium 139 136 - 145 mmol/L    Potassium 3.7 3.5 - 5.1 mmol/L    Chloride 104 95 - 110 mmol/L    CO2 24 23 - 29 mmol/L    Glucose 86 70 - 110 mg/dL    BUN, Bld 18 6 - 20 mg/dL    Creatinine 1.1 0.5 - 1.4 mg/dL "    Calcium 9.5 8.7 - 10.5 mg/dL    Total Protein 6.8 6.0 - 8.4 g/dL    Albumin 4.0 3.5 - 5.2 g/dL    Total Bilirubin 0.5 0.1 - 1.0 mg/dL    Alkaline Phosphatase 51 (L) 55 - 135 U/L     (H) 10 - 40 U/L     (H) 10 - 44 U/L    Anion Gap 11 8 - 16 mmol/L    eGFR if African American >60 >60 mL/min/1.73 m^2    eGFR if non African American >60 >60 mL/min/1.73 m^2   Urinalysis - Clean Catch   Result Value Ref Range    Specimen UA Urine, Clean Catch     Color, UA Yellow Yellow, Straw, Anahi    Appearance, UA Clear Clear    pH, UA 7.0 5.0 - 8.0    Specific Gravity, UA 1.020 1.005 - 1.030    Protein, UA Negative Negative    Glucose, UA Negative Negative    Ketones, UA Negative Negative    Bilirubin (UA) Negative Negative    Occult Blood UA Negative Negative    Nitrite, UA Negative Negative    Urobilinogen, UA 1.0 <2.0 EU/dL    Leukocytes, UA Negative Negative   Sedimentation rate   Result Value Ref Range    Sed Rate 3 0 - 10 mm/Hr   C-reactive protein   Result Value Ref Range    CRP 2.0 0.0 - 8.2 mg/L   Uric acid   Result Value Ref Range    Uric Acid 6.4 3.4 - 7.0 mg/dL   CK   Result Value Ref Range    CPK 2117 (H) 20 - 200 U/L   Troponin I   Result Value Ref Range    Troponin I <0.006 0.000 - 0.026 ng/mL   Brain natriuretic peptide   Result Value Ref Range    BNP <10 0 - 99 pg/mL     Imaging Results:  Imaging Results          X-Ray Elbow Complete Left (Final result)  Result time 05/06/19 07:27:35    Final result by Talib Royal MD (05/06/19 07:27:35)                 Impression:      No acute fracture or dislocation.      Electronically signed by: Talib Royal MD  Date:    05/06/2019  Time:    07:27             Narrative:    EXAMINATION:  XR ELBOW COMPLETE 3 VIEW LEFT    CLINICAL HISTORY:  XR ELBOW COMPLETE 3 VIEW LEFTPain in unspecified elbow    COMPARISON:  None    FINDINGS:  Four views of the left elbow were obtained.    No evidence of acute fracture or dislocation.  Bony mineralization is normal.  Soft  tissues are unremarkable.   No joint effusion.                              3:19 AM: Per Dr. Coelho, pt's XR results:     No bony deformities, edema noted around L elbow    The EKG was ordered, reviewed, and independently interpreted by the ED provider.  Interpretation time: 3:05  Rate: 67 BPM  Rhythm: Normal sinus rhythm with sinus arrhythmia  Interpretation: No acute ST changes. No STEMI.         The Emergency Provider reviewed the vital signs and test results, which are outlined above.    ED Discussion     2:55 AM: Re-evaluated pt. Pt is resting comfortably and is in no acute distress.  Pt states that the Toradol injection has not improved his LUE pain. D/w pt all pertinent results. D/w pt any concerns expressed at this time. Answered all questions. Pt expresses understanding at this time.    3:41 AM: Reassessed pt at this time. Discussed with pt all pertinent ED information and results. Discussed pt dx and plan of tx. Gave pt all f/u and return to the ED instructions. All questions and concerns were addressed at this time. Pt expresses understanding of information and instructions, and is comfortable with plan to discharge. Pt is stable for discharge.    I discussed with patient and/or family/caretaker that evaluation in the ED does not suggest any emergent or life threatening medical conditions requiring immediate intervention beyond what was provided in the ED, and I believe patient is safe for discharge.  Regardless, an unremarkable evaluation in the ED does not preclude the development or presence of a serious of life threatening condition. As such, patient was instructed to return immediately for any worsening or change in current symptoms.    ED Medication(s):  Medications   ketorolac injection 30 mg (30 mg Intravenous Given 5/6/19 7378)   sodium chloride 0.9% bolus 1,000 mL (0 mLs Intravenous Stopped 5/6/19 8522)       Discharge Medication List as of 5/6/2019  3:38 AM      START taking these medications     Details   cyclobenzaprine (FLEXERIL) 10 MG tablet Take 1 tablet (10 mg total) by mouth 3 (three) times daily as needed for Muscle spasms., Starting Mon 5/6/2019, Until Sat 5/11/2019, Print      meloxicam (MOBIC) 15 MG tablet Take 1 tablet (15 mg total) by mouth once daily., Starting Mon 5/6/2019, Print                Medical Decision Making    Medical Decision Making:   Clinical Tests:   Lab Tests: Ordered and Reviewed  Radiological Study: Ordered and Reviewed  Medical Tests: Ordered and Reviewed           Scribe Attestation:   Scribe #1: I performed the above scribed service and the documentation accurately describes the services I performed. I attest to the accuracy of the note.    Attending:   Physician Attestation Statement for Scribe #1: I, Renetta Coelho MD, personally performed the services described in this documentation, as scribed by Jd Arias, in my presence, and it is both accurate and complete.          Clinical Impression       ICD-10-CM ICD-9-CM   1. Left arm pain M79.602 729.5   2. Elbow pain M25.529 719.42       Disposition:   Disposition: Discharged  Condition: Stable         Renetta Coelho MD  05/08/19 0610       Renetta Coelho MD  05/08/19 0610

## 2019-12-15 ENCOUNTER — HOSPITAL ENCOUNTER (EMERGENCY)
Facility: HOSPITAL | Age: 34
Discharge: HOME | End: 2019-12-16
Attending: EMERGENCY MEDICINE
Payer: OTHER GOVERNMENT

## 2019-12-15 DIAGNOSIS — G89.18 ACUTE POSTOPERATIVE PAIN: Primary | ICD-10-CM

## 2019-12-15 DIAGNOSIS — R42 DIZZINESS: ICD-10-CM

## 2019-12-15 PROCEDURE — 96361 HYDRATE IV INFUSION ADD-ON: CPT

## 2019-12-15 PROCEDURE — 96374 THER/PROPH/DIAG INJ IV PUSH: CPT

## 2019-12-15 PROCEDURE — 99284 EMERGENCY DEPT VISIT MOD MDM: CPT | Mod: 25

## 2019-12-16 VITALS
BODY MASS INDEX: 23.29 KG/M2 | OXYGEN SATURATION: 100 % | TEMPERATURE: 99 F | WEIGHT: 181.44 LBS | HEIGHT: 74 IN | RESPIRATION RATE: 15 BRPM | SYSTOLIC BLOOD PRESSURE: 142 MMHG | DIASTOLIC BLOOD PRESSURE: 89 MMHG | HEART RATE: 65 BPM

## 2019-12-16 LAB
ANION GAP SERPL CALC-SCNC: 11 MMOL/L (ref 8–16)
BASOPHILS # BLD AUTO: 0.02 K/UL (ref 0–0.2)
BASOPHILS NFR BLD: 0.3 % (ref 0–1.9)
BUN SERPL-MCNC: 9 MG/DL (ref 6–20)
CALCIUM SERPL-MCNC: 9.4 MG/DL (ref 8.7–10.5)
CHLORIDE SERPL-SCNC: 104 MMOL/L (ref 95–110)
CO2 SERPL-SCNC: 27 MMOL/L (ref 23–29)
CREAT SERPL-MCNC: 0.9 MG/DL (ref 0.5–1.4)
DIFFERENTIAL METHOD: ABNORMAL
EOSINOPHIL # BLD AUTO: 0.6 K/UL (ref 0–0.5)
EOSINOPHIL NFR BLD: 8.5 % (ref 0–8)
ERYTHROCYTE [DISTWIDTH] IN BLOOD BY AUTOMATED COUNT: 11.8 % (ref 11.5–14.5)
EST. GFR  (AFRICAN AMERICAN): >60 ML/MIN/1.73 M^2
EST. GFR  (NON AFRICAN AMERICAN): >60 ML/MIN/1.73 M^2
GLUCOSE SERPL-MCNC: 96 MG/DL (ref 70–110)
HCT VFR BLD AUTO: 41.5 % (ref 40–54)
HGB BLD-MCNC: 14.4 G/DL (ref 14–18)
IMM GRANULOCYTES # BLD AUTO: 0.01 K/UL (ref 0–0.04)
IMM GRANULOCYTES NFR BLD AUTO: 0.1 % (ref 0–0.5)
LYMPHOCYTES # BLD AUTO: 3 K/UL (ref 1–4.8)
LYMPHOCYTES NFR BLD: 41.5 % (ref 18–48)
MCH RBC QN AUTO: 31.6 PG (ref 27–31)
MCHC RBC AUTO-ENTMCNC: 34.7 G/DL (ref 32–36)
MCV RBC AUTO: 91 FL (ref 82–98)
MONOCYTES # BLD AUTO: 0.4 K/UL (ref 0.3–1)
MONOCYTES NFR BLD: 5.7 % (ref 4–15)
NEUTROPHILS # BLD AUTO: 3.2 K/UL (ref 1.8–7.7)
NEUTROPHILS NFR BLD: 43.9 % (ref 38–73)
NRBC BLD-RTO: 0 /100 WBC
PLATELET # BLD AUTO: 136 K/UL (ref 150–350)
PMV BLD AUTO: 11.3 FL (ref 9.2–12.9)
POTASSIUM SERPL-SCNC: 3.8 MMOL/L (ref 3.5–5.1)
RBC # BLD AUTO: 4.56 M/UL (ref 4.6–6.2)
SODIUM SERPL-SCNC: 142 MMOL/L (ref 136–145)
WBC # BLD AUTO: 7.18 K/UL (ref 3.9–12.7)

## 2019-12-16 PROCEDURE — 85025 COMPLETE CBC W/AUTO DIFF WBC: CPT

## 2019-12-16 PROCEDURE — 25000003 PHARM REV CODE 250: Performed by: EMERGENCY MEDICINE

## 2019-12-16 PROCEDURE — 80048 BASIC METABOLIC PNL TOTAL CA: CPT

## 2019-12-16 PROCEDURE — 63600175 PHARM REV CODE 636 W HCPCS: Performed by: EMERGENCY MEDICINE

## 2019-12-16 RX ORDER — KETOROLAC TROMETHAMINE 30 MG/ML
15 INJECTION, SOLUTION INTRAMUSCULAR; INTRAVENOUS
Status: COMPLETED | OUTPATIENT
Start: 2019-12-16 | End: 2019-12-16

## 2019-12-16 RX ORDER — ACETAMINOPHEN 500 MG
1000 TABLET ORAL
Status: COMPLETED | OUTPATIENT
Start: 2019-12-16 | End: 2019-12-16

## 2019-12-16 RX ORDER — MECLIZINE HYDROCHLORIDE 25 MG/1
25 TABLET ORAL EVERY 6 HOURS
Qty: 20 TABLET | Refills: 0 | Status: SHIPPED | OUTPATIENT
Start: 2019-12-16

## 2019-12-16 RX ORDER — MECLIZINE HYDROCHLORIDE 25 MG/1
50 TABLET ORAL
Status: COMPLETED | OUTPATIENT
Start: 2019-12-16 | End: 2019-12-16

## 2019-12-16 RX ADMIN — KETOROLAC TROMETHAMINE 15 MG: 30 INJECTION, SOLUTION INTRAMUSCULAR at 12:12

## 2019-12-16 RX ADMIN — MECLIZINE HYDROCHLORIDE 50 MG: 25 TABLET ORAL at 12:12

## 2019-12-16 RX ADMIN — ACETAMINOPHEN 1000 MG: 500 TABLET ORAL at 12:12

## 2019-12-16 RX ADMIN — SODIUM CHLORIDE 1000 ML: 0.9 INJECTION, SOLUTION INTRAVENOUS at 01:12

## 2019-12-16 NOTE — ED PROVIDER NOTES
"SCRIBE #1 NOTE: I, Ofelia Mckinney, am scribing for, and in the presence of, Nadeem Galindo MD. I have scribed the entire note.       History     Chief Complaint   Patient presents with    Altered Mental Status     spouse reports pt had surgery 12/12 and has been "out of it, dizzy, and drowsy" since; pt c/o HA, dizziness, weakness     Review of patient's allergies indicates:   Allergen Reactions    Chicken derived          History of Present Illness     HPI    12/16/2019, 12:17 AM  History obtained from the patient and wife      History of Present Illness: Calin Araya is a 34 y.o. male patient with a PMHx of ADD, anxiety, PTSD who presents to the Emergency Department for evaluation of worsening fatigue which onset s/p turboplasty and septoplasty on 12/12. Wife reports pt has been "out of it" and "this is not like him". Symptoms are constant and moderate in severity.  No mitigating or exacerbating factors reported. Associated sxs include dizziness, hearing loss, lightheadedness, and facial pain. Patient denies any fever, chills, CP, SOB, extremity numbness/weakness, and all other sxs at this time. Pt last took pain medication this AM. No further complaints or concerns at this time.       Arrival mode: Personal vehicle      PCP: [facility]        Past Medical History:  Past Medical History:   Diagnosis Date    ADD (attention deficit disorder)     Anxiety     Insomnia     PTSD (post-traumatic stress disorder)     PTSD (post-traumatic stress disorder)        Past Surgical History:  Past Surgical History:   Procedure Laterality Date    CYST REMOVAL      right hand         Family History:  Family History   Problem Relation Age of Onset    Hypertension Father        Social History:  Social History     Tobacco Use    Smoking status: Never Smoker    Smokeless tobacco: Never Used   Substance and Sexual Activity    Alcohol use: Yes    Drug use: No    Sexual activity: Yes     Partners: Female "        Review of Systems     Review of Systems   Constitutional: Positive for fatigue. Negative for chills and fever.   HENT: Positive for hearing loss. Negative for sore throat.         + facial pain   Respiratory: Negative for shortness of breath.    Cardiovascular: Negative for chest pain.   Gastrointestinal: Negative for nausea.   Genitourinary: Negative for dysuria.   Musculoskeletal: Negative for back pain.   Skin: Negative for rash.   Neurological: Positive for dizziness and light-headedness. Negative for weakness and numbness.   Hematological: Does not bruise/bleed easily.   All other systems reviewed and are negative.       Physical Exam     Initial Vitals [12/15/19 2352]   BP Pulse Resp Temp SpO2   (!) 141/80 76 16 98.5 °F (36.9 °C) 99 %      MAP       --          Physical Exam  Nursing Notes and Vital Signs Reviewed.  Constitutional: Patient is in no acute distress. Well-developed and well-nourished. Appears mildly fatigue.  Head: Atraumatic. Normocephalic.  Eyes: PERRL. EOM intact. Conjunctivae are not pale. No scleral icterus.  Ears: Right TM normal. Left TM normal. No erythema. No bulging. No effusion or air-fluid levels. No perforation.   Nose: Paranasal soft tissue swelling without fluctuance.  Throat: Moist mucous membranes. Posterior oropharynx is symmetric without erythema. Tonsillar exudate is not present. No trismus. Normal handling of secretions. No stridor.  Neck: Supple. Full ROM. No lymphadenopathy.  Cardiovascular: Regular rate. Regular rhythm. No murmurs, rubs, or gallops. Distal pulses are 2+ and symmetric.  Pulmonary/Chest: No respiratory distress. Clear to auscultation bilaterally. No wheezing or rales.  Abdominal: Soft and non-distended.  There is no tenderness.  No rebound, guarding, or rigidity. Good bowel sounds.  Genitourinary: No CVA tenderness  Musculoskeletal: Moves all extremities. No obvious deformities. No edema. No calf tenderness.  Skin: Warm and dry.  Neurological:  Alert,  "awake, and appropriate.  Normal speech.  No acute focal neurological deficits are appreciated.  Psychiatric: Normal affect. Good eye contact. Appropriate in content.     ED Course   Procedures  ED Vital Signs:  Vitals:    12/15/19 2352 12/16/19 0010 12/16/19 0015 12/16/19 0100   BP: (!) 141/80 (!) 157/93  135/77   Pulse: 76 76 72 62   Resp: 16 14 15 17   Temp: 98.5 °F (36.9 °C)      TempSrc: Oral      SpO2: 99% 100% 100% 98%   Weight: 82.3 kg (181 lb 7 oz)      Height: 6' 2" (1.88 m)       12/16/19 0130   BP: (!) 142/89   Pulse: 65   Resp: 15   Temp: 98.6 °F (37 °C)   TempSrc: Oral   SpO2: 100%   Weight:    Height:        Abnormal Lab Results:  Labs Reviewed   CBC W/ AUTO DIFFERENTIAL - Abnormal; Notable for the following components:       Result Value    RBC 4.56 (*)     Mean Corpuscular Hemoglobin 31.6 (*)     Platelets 136 (*)     Eos # 0.6 (*)     Eosinophil% 8.5 (*)     All other components within normal limits   BASIC METABOLIC PANEL        All Lab Results:  Results for orders placed or performed during the hospital encounter of 12/15/19   CBC auto differential   Result Value Ref Range    WBC 7.18 3.90 - 12.70 K/uL    RBC 4.56 (L) 4.60 - 6.20 M/uL    Hemoglobin 14.4 14.0 - 18.0 g/dL    Hematocrit 41.5 40.0 - 54.0 %    Mean Corpuscular Volume 91 82 - 98 fL    Mean Corpuscular Hemoglobin 31.6 (H) 27.0 - 31.0 pg    Mean Corpuscular Hemoglobin Conc 34.7 32.0 - 36.0 g/dL    RDW 11.8 11.5 - 14.5 %    Platelets 136 (L) 150 - 350 K/uL    MPV 11.3 9.2 - 12.9 fL    Immature Granulocytes 0.1 0.0 - 0.5 %    Gran # (ANC) 3.2 1.8 - 7.7 K/uL    Immature Grans (Abs) 0.01 0.00 - 0.04 K/uL    Lymph # 3.0 1.0 - 4.8 K/uL    Mono # 0.4 0.3 - 1.0 K/uL    Eos # 0.6 (H) 0.0 - 0.5 K/uL    Baso # 0.02 0.00 - 0.20 K/uL    nRBC 0 0 /100 WBC    Gran% 43.9 38.0 - 73.0 %    Lymph% 41.5 18.0 - 48.0 %    Mono% 5.7 4.0 - 15.0 %    Eosinophil% 8.5 (H) 0.0 - 8.0 %    Basophil% 0.3 0.0 - 1.9 %    Differential Method Automated    Basic metabolic " panel   Result Value Ref Range    Sodium 142 136 - 145 mmol/L    Potassium 3.8 3.5 - 5.1 mmol/L    Chloride 104 95 - 110 mmol/L    CO2 27 23 - 29 mmol/L    Glucose 96 70 - 110 mg/dL    BUN, Bld 9 6 - 20 mg/dL    Creatinine 0.9 0.5 - 1.4 mg/dL    Calcium 9.4 8.7 - 10.5 mg/dL    Anion Gap 11 8 - 16 mmol/L    eGFR if African American >60 >60 mL/min/1.73 m^2    eGFR if non African American >60 >60 mL/min/1.73 m^2         Imaging Results:  Imaging Results    None                 The Emergency Provider reviewed the vital signs and test results, which are outlined above.     ED Discussion       1:29 AM: Reassessed pt at this time. Discussed with pt all pertinent ED information and results. Discussed pt dx and plan of tx. Gave pt all f/u and return to the ED instructions. All questions and concerns were addressed at this time. Pt expresses understanding of information and instructions, and is comfortable with plan to discharge. Pt is stable for discharge.    I discussed with patient and/or family/caretaker that evaluation in the ED does not suggest any emergent or life threatening medical conditions requiring immediate intervention beyond what was provided in the ED, and I believe patient is safe for discharge.  Regardless, an unremarkable evaluation in the ED does not preclude the development or presence of a serious of life threatening condition. As such, patient was instructed to return immediately for any worsening or change in current symptoms.         Medical Decision Making:   Clinical Tests:   Lab Tests: Ordered and Reviewed           ED Medication(s):  Medications   meclizine tablet 50 mg (50 mg Oral Given 12/16/19 0057)   ketorolac injection 15 mg (15 mg Intravenous Given 12/16/19 0056)   sodium chloride 0.9% bolus 1,000 mL (0 mLs Intravenous Stopped 12/16/19 0144)   acetaminophen tablet 1,000 mg (1,000 mg Oral Given 12/16/19 0057)       Discharge Medication List as of 12/16/2019  1:35 AM      START taking these  medications    Details   meclizine (ANTIVERT) 25 mg tablet Take 1 tablet (25 mg total) by mouth every 6 (six) hours., Starting Mon 12/16/2019, Print             Follow-up Information     [facility] as [facility] In [facility] day.           [facility] - .    Specialty:  Emergency Medicine  Why:  As needed, If symptoms worsen  Contact information:  [address]  [facility] [address] [phone]                     Scribe Attestation:   Scribe #1: I performed the above scribed service and the documentation accurately describes the services I performed. I attest to the accuracy of the note.     Attending:   Physician Attestation Statement for Scribe #1: I, Nadeem Galindo MD, personally performed the services described in this documentation, as scribed by Ofelia Mckinney, in my presence, and it is both accurate and complete.           Clinical Impression       ICD-10-CM ICD-9-CM   1. Acute postoperative pain G89.18 338.18   2. Dizziness R42 780.4       Disposition:   Disposition: Discharged  Condition: Stable         Nadeem Galindo MD  12/16/19 0322

## 2020-09-18 ENCOUNTER — TELEPHONE (OUTPATIENT)
Dept: ORTHOPEDICS | Facility: CLINIC | Age: 35
End: 2020-09-18

## 2020-09-18 DIAGNOSIS — M25.562 LEFT KNEE PAIN, UNSPECIFIED CHRONICITY: Primary | ICD-10-CM

## 2020-09-18 NOTE — TELEPHONE ENCOUNTER
Spoke to patient in regards to ortho appt. Informed patient to arrive 30 minutes prior for xrays. Patient verbalized understanding. ms

## 2020-09-24 ENCOUNTER — HOSPITAL ENCOUNTER (OUTPATIENT)
Dept: RADIOLOGY | Facility: HOSPITAL | Age: 35
Discharge: HOME | End: 2020-09-24
Attending: ORTHOPAEDIC SURGERY
Payer: OTHER GOVERNMENT

## 2020-09-24 ENCOUNTER — OFFICE VISIT (OUTPATIENT)
Dept: ORTHOPEDICS | Facility: CLINIC | Age: 35
End: 2020-09-24
Payer: COMMERCIAL

## 2020-09-24 ENCOUNTER — OFFICE VISIT (OUTPATIENT)
Dept: ORTHOPEDICS | Facility: CLINIC | Age: 35
End: 2020-09-24
Payer: OTHER GOVERNMENT

## 2020-09-24 VITALS
HEIGHT: 74 IN | BODY MASS INDEX: 23.23 KG/M2 | SYSTOLIC BLOOD PRESSURE: 138 MMHG | WEIGHT: 181 LBS | HEART RATE: 53 BPM | DIASTOLIC BLOOD PRESSURE: 91 MMHG

## 2020-09-24 VITALS
BODY MASS INDEX: 23.23 KG/M2 | DIASTOLIC BLOOD PRESSURE: 80 MMHG | SYSTOLIC BLOOD PRESSURE: 119 MMHG | WEIGHT: 181 LBS | HEART RATE: 61 BPM | HEIGHT: 74 IN

## 2020-09-24 DIAGNOSIS — M25.562 LEFT KNEE PAIN, UNSPECIFIED CHRONICITY: Primary | ICD-10-CM

## 2020-09-24 DIAGNOSIS — Z98.890 HISTORY OF LEFT KNEE SURGERY: ICD-10-CM

## 2020-09-24 DIAGNOSIS — Z98.890 HISTORY OF LEFT KNEE SURGERY: Primary | ICD-10-CM

## 2020-09-24 DIAGNOSIS — F41.9 ANXIETY: ICD-10-CM

## 2020-09-24 DIAGNOSIS — M25.562 LEFT KNEE PAIN, UNSPECIFIED CHRONICITY: ICD-10-CM

## 2020-09-24 PROCEDURE — 99203 PR OFFICE/OUTPT VISIT, NEW, LEVL III, 30-44 MIN: ICD-10-PCS | Mod: S$PBB,,, | Performed by: ORTHOPAEDIC SURGERY

## 2020-09-24 PROCEDURE — 99203 OFFICE O/P NEW LOW 30 MIN: CPT | Mod: S$PBB,,, | Performed by: ORTHOPAEDIC SURGERY

## 2020-09-24 PROCEDURE — 99999 PR PBB SHADOW E&M-EST. PATIENT-LVL IV: CPT | Mod: PBBFAC,,, | Performed by: ORTHOPAEDIC SURGERY

## 2020-09-24 PROCEDURE — 73564 X-RAY EXAM KNEE 4 OR MORE: CPT | Mod: 26,LT,, | Performed by: RADIOLOGY

## 2020-09-24 PROCEDURE — 99999 PR PBB SHADOW E&M-EST. PATIENT-LVL III: CPT | Mod: PBBFAC,,, | Performed by: FAMILY MEDICINE

## 2020-09-24 PROCEDURE — 73562 X-RAY EXAM OF KNEE 3: CPT | Mod: 26,59,RT, | Performed by: RADIOLOGY

## 2020-09-24 PROCEDURE — 73564 XR KNEE ORTHO LEFT WITH FLEXION: ICD-10-PCS | Mod: 26,LT,, | Performed by: RADIOLOGY

## 2020-09-24 PROCEDURE — 99214 OFFICE O/P EST MOD 30 MIN: CPT | Mod: PBBFAC,25,27 | Performed by: ORTHOPAEDIC SURGERY

## 2020-09-24 PROCEDURE — 99203 OFFICE O/P NEW LOW 30 MIN: CPT | Mod: S$PBB,,, | Performed by: FAMILY MEDICINE

## 2020-09-24 PROCEDURE — 99203 PR OFFICE/OUTPT VISIT, NEW, LEVL III, 30-44 MIN: ICD-10-PCS | Mod: S$PBB,,, | Performed by: FAMILY MEDICINE

## 2020-09-24 PROCEDURE — 73562 X-RAY EXAM OF KNEE 3: CPT | Mod: TC,RT

## 2020-09-24 PROCEDURE — 99999 PR PBB SHADOW E&M-EST. PATIENT-LVL IV: ICD-10-PCS | Mod: PBBFAC,,, | Performed by: ORTHOPAEDIC SURGERY

## 2020-09-24 PROCEDURE — 73562 XR KNEE ORTHO LEFT WITH FLEXION: ICD-10-PCS | Mod: 26,59,RT, | Performed by: RADIOLOGY

## 2020-09-24 PROCEDURE — 99999 PR PBB SHADOW E&M-EST. PATIENT-LVL III: ICD-10-PCS | Mod: PBBFAC,,, | Performed by: FAMILY MEDICINE

## 2020-09-24 PROCEDURE — 99213 OFFICE O/P EST LOW 20 MIN: CPT | Mod: PBBFAC,25 | Performed by: FAMILY MEDICINE

## 2020-09-24 RX ORDER — PANTOPRAZOLE SODIUM 40 MG/1
40 TABLET, DELAYED RELEASE ORAL
Status: ON HOLD | COMMUNITY
End: 2023-02-17 | Stop reason: SDUPTHER

## 2020-09-24 NOTE — PATIENT INSTRUCTIONS
Patient is being referred to Dr. Jeffery Hawthorne orthopedic knee surgeon for 2nd opinion which he desires from his knee surgery which was done in March with Dr. Carbone.

## 2020-09-24 NOTE — PROGRESS NOTES
Patient ID: Calin Araya  YOB: 1985  MRN: [id number]    Chief Complaint: Pain of the Left Knee    [facility]    History of Present Illness: Calin Araya is a right-hand dominant 35 y.o. male retired [employer] who presents today with LEFT knee pain. Patient states that he had LEFT knee surgery in March 2020 with Dr. Lucia Chavez of [facility]    Knee Pain   This is a chronic problem. The current episode started more than 1 year ago. The problem occurs constantly. The problem has been gradually worsening. The quality of the pain is described as aching, tingling, numb, sharp, shooting, throbbing and burning. The pain is at a severity of 10/10. Associated symptoms include joint swelling, a limited range of motion, numbness, stiffness and tingling. Pertinent negatives include no fever. The symptoms are aggravated by activity, walking, twisting and standing. He has tried cold, OTC ointments, NSAIDs, oral narcotics, brace/corset, heat and injection treatment for the symptoms. Physical therapy was ineffective.      Past Medical History:   Past Medical History:   Diagnosis Date    ADD (attention deficit disorder)     Anxiety     Insomnia     PTSD (post-traumatic stress disorder)     PTSD (post-traumatic stress disorder)      Past Surgical History:   Procedure Laterality Date    CYST REMOVAL      right hand     Family History   Problem Relation Age of Onset    Hypertension Father      Social History     Socioeconomic History    Marital status: Significant Other     Spouse name: Not on file    Number of children: Not on file    Years of education: Not on file    Highest education level: Not on file   Occupational History    Not on file   Social Needs    Financial resource strain: Not on file    Food insecurity     Worry: Not on file     Inability: Not on file    Transportation needs     Medical: Not on file     Non-medical: Not on file   Tobacco Use    Smoking  status: Never Smoker    Smokeless tobacco: Never Used   Substance and Sexual Activity    Alcohol use: Yes    Drug use: No    Sexual activity: Yes     Partners: Female   Lifestyle    Physical activity     Days per week: Not on file     Minutes per session: Not on file    Stress: Not on file   Relationships    Social connections     Talks on phone: Not on file     Gets together: Not on file     Attends [employer] service: Not on file     Active member of club or organization: Not on file     Attends meetings of clubs or organizations: Not on file     Relationship status: Not on file   Other Topics Concern    Not on file   Social History Narrative    Not on file     Medication List with Changes/Refills   Current Medications    CLONAZEPAM (KLONOPIN) 1 MG TABLET    Take 1 mg by mouth 2 (two) times daily as needed for Anxiety.    DEXTROAMPHETAMINE-AMPHETAMINE (ADDERALL XR) 20 MG 24 HR CAPSULE    Take 20 mg by mouth every morning.    DORNASE ALPHA (PULMOZYME) 1 MG/ML NEBULIZER SOLUTION    Inhale 2.5 mg into the lungs.    MECLIZINE (ANTIVERT) 25 MG TABLET    Take 1 tablet (25 mg total) by mouth every 6 (six) hours.    MELOXICAM (MOBIC) 15 MG TABLET    Take 1 tablet (15 mg total) by mouth once daily.    PANTOPRAZOLE (PROTONIX) 40 MG TABLET    Take 40 mg by mouth.    ZOLPIDEM (AMBIEN) 10 MG TAB    Take 5 mg by mouth nightly as needed.     Review of patient's allergies indicates:   Allergen Reactions    Chicken derived     Hydrocodone Other (See Comments)     Pt says he CAN take Percocet    Milk containing products      EOE    Tramadol      Review of Systems   Constitution: Negative for chills and fever.   HENT: Negative for ear discharge and hearing loss.    Eyes: Negative for blurred vision and visual disturbance.   Cardiovascular: Negative for chest pain and leg swelling.   Respiratory: Negative for cough and shortness of breath.    Endocrine: Negative for polyuria.   Hematologic/Lymphatic: Negative for bleeding  problem.   Skin: Negative for rash.   Musculoskeletal: Positive for joint pain, joint swelling, muscle cramps, muscle weakness and stiffness. Negative for back pain.   Gastrointestinal: Negative for nausea and vomiting.   Genitourinary: Negative for hematuria.   Neurological: Positive for numbness and tingling. Negative for loss of balance and paresthesias.   Psychiatric/Behavioral: Negative for altered mental status.       Physical Exam:   Body mass index is 23.24 kg/m².  Vitals:    09/24/20 1338   BP: (!) 138/91   Pulse: (!) 53      GENERAL: Well appearing, appropriate for stated age, no acute distress.  CARDIOVASCULAR: Pulses regular by peripheral palpation.  PULMONARY: Respirations are even and non-labored.  NEURO: Awake, alert, and oriented x 3.  PSYCH: Mood & affect are appropriate.  HEENT: Head is normocephalic and atraumatic.  Ortho/SPM Exam  Right knee: Range of motion within normal limits and painless. Intact motor and sensation. Good perfusion. No tenderness, gross deformity, gross instability, or skin lesions.  Left knee:  Trace effusion.  Range of motion limited by pain.  Stable ligamentously to exam.  Calf soft nontender.  5/5 knee flexion extension.  Neurovascular intact distally both of the central patellar tracking.  Diffusely tender throughout joint lines and patellofemoral joint.  Positive patellofemoral crepitus.  No skin changes or signs of infection walks with an antalgic gait.    Imaging:    X-ray Knee Ortho Left with Flexion  Narrative: EXAMINATION:  XR KNEE ORTHO LEFT WITH FLEXION    CLINICAL HISTORY:  . Pain in left knee    TECHNIQUE:  AP standing view of both knees, PA flexion standing views of both knees, and Merchant views of both knees were performed. A lateral view of the left knee was also performed.    COMPARISON:  None    FINDINGS:  No significant effusion.  Tiny early patellofemoral osteophytes.  Joint spaces are maintained.  No acute osseous abnormality.  Soft tissues appear  normal.  Impression: As above    Electronically signed by: Justin Burnett MD  Date:    09/24/2020  Time:    13:15      Relevant imaging results reviewed and interpreted by me, discussed with the patient and / or family today.     Other Tests:     No other tests performed today.    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    History of left knee surgery Yes        Plan:   [facility]   MRI left knee to assess cartilage      Follow-up: after MRI or sooner if there are any problems between now and then.    Disclaimer: This note was prepared using a voice recognition system and is likely to have sound alike errors within the text.

## 2020-09-24 NOTE — PROGRESS NOTES
Subjective:     Patient ID: Calin Araya is a 35 y.o. male.    Chief Complaint: Pain of the Left Knee      HPI:  The patient had left knee surgery Dr. Lucia Carbone at [facility] in March [facility].  He states that he still has some pains in problems in the knee and [facility] with a another orthopedic knee surgeon, Dr. Jeffery Hawthorne.  This last detail of information was not apparent until the very end of the office visit today.  Problem - left knee pain    Duration - several months    Severity -    Pain - 10/10.  Patient states the pain is on the top edge of his knee and on both sides of the knee.   Limitations - affects ADLs and physical activity.  The patient and the female  accompanying him today states that he is very active in exercising on a regular basis.    Previous Treatment - physical therapy after his surgery in March until COVID virus intervened and he had to cut his therapy short.  I have offered to reinstate his physical therapy but he refuses saying that he does enough of his own physical exercise and can do it at home.    Other Information - in addition the patient states that since being unable to exercise months after his surgery that his back started flaring up again on him and he wants to have his back taken care of also.      Past Medical History:   Diagnosis Date    ADD (attention deficit disorder)     Anxiety     Insomnia     PTSD (post-traumatic stress disorder)     PTSD (post-traumatic stress disorder)      Past Surgical History:   Procedure Laterality Date    CYST REMOVAL      right hand     Family History   Problem Relation Age of Onset    Hypertension Father      Social History     Socioeconomic History    Marital status: Significant Other     Spouse name: Not on file    Number of children: Not on file    Years of education: Not on file    Highest education level: Not on file   Occupational History    Not  on file   Social Needs    Financial resource strain: Not on file    Food insecurity     Worry: Not on file     Inability: Not on file    Transportation needs     Medical: Not on file     Non-medical: Not on file   Tobacco Use    Smoking status: Never Smoker    Smokeless tobacco: Never Used   Substance and Sexual Activity    Alcohol use: Yes    Drug use: No    Sexual activity: Yes     Partners: Female   Lifestyle    Physical activity     Days per week: Not on file     Minutes per session: Not on file    Stress: Not on file   Relationships    Social connections     Talks on phone: Not on file     Gets together: Not on file     Attends Yazdanism service: Not on file     Active member of club or organization: Not on file     Attends meetings of clubs or organizations: Not on file     Relationship status: Not on file   Other Topics Concern    Not on file   Social History Narrative    Not on file       Current Outpatient Medications:     clonazePAM (KLONOPIN) 1 MG tablet, Take 1 mg by mouth 2 (two) times daily as needed for Anxiety., Disp: , Rfl:     dextroamphetamine-amphetamine (ADDERALL XR) 20 MG 24 hr capsule, Take 20 mg by mouth every morning., Disp: , Rfl:     dornase alpha (PULMOZYME) 1 mg/mL nebulizer solution, Inhale 2.5 mg into the lungs., Disp: , Rfl:     meclizine (ANTIVERT) 25 mg tablet, Take 1 tablet (25 mg total) by mouth every 6 (six) hours., Disp: 20 tablet, Rfl: 0    meloxicam (MOBIC) 15 MG tablet, Take 1 tablet (15 mg total) by mouth once daily., Disp: 20 tablet, Rfl: 0    pantoprazole (PROTONIX) 40 MG tablet, Take 40 mg by mouth., Disp: , Rfl:     zolpidem (AMBIEN) 10 mg Tab, Take 5 mg by mouth nightly as needed., Disp: , Rfl:   Review of patient's allergies indicates:   Allergen Reactions    Chicken derived     Hydrocodone Other (See Comments)     Pt says he CAN take Percocet    Milk containing products      EOE    Tramadol      Review of Systems   Constitutional: Negative for  chills, fever and weight loss.   Respiratory: Negative for shortness of breath.    Cardiovascular: Negative for chest pain and palpitations.   Musculoskeletal: Positive for back pain.       Objective:   Body mass index is 23.24 kg/m².  Vitals:    09/24/20 1148   BP: 119/80   Pulse: 61           Ortho/SPM Exam  General - A&O, NAD.  Thin but well-nourished well-developed ambulatory with normal gait.    Respiratory Effort - Normal    Extremity (Body Part) - left knee     -No acute deformity/swelling.    ROM - 0-110 degrees  TTP - mild diffuse tenderness to palpation along the superior patella region and both joint lines    Stability -no varus or valgus laxity noted and negative Lachman's today.    Strength - 4/5.  Patient noted to have some diffuse atrophy of the left quadriceps.  Neurovascular Intact -     Other -     Psychiatric - Affect & Cognition WNL, patient's overall demeanor somewhat anxious particularly when he became upset that he was not seeing the actual orthopedic surgeon at present.      Plan for Improvement - offered the patient a cortisone injection in case he wanted to try to decrease his pain level and improved mobility but he refused.  He did ask about possibility of viscosupplementation and I explained to him that I do perform those injections but in speaking further we determined that he was actually supposed to see Dr. Jeffery Hawthorne orthopedic surgeon today for a 2nd opinion on his knee and he became somewhat upset and anxious that he was not able to see that particular DrRadha.  I did go over his x-rays with him and offered to help him in any way to restart physical therapy or for us to provide other management resources to help him but he seemed to be upset and only focused on getting to see the surgeon so we decided to finish the appointment and make him an appointment with Dr. Jeffery Hawthorne is office.    I apologized profusely to the patient for the scheduling error  and promised to have this  rectified as soon as possible.    When the patient left he was upset that he cannot get in to see Dr. Jeffery Hawthorne today however I understand that Dr. Abraham's office is making special arrangements to reach out to the patient and try to get him in as quickly as possible.      IMAGING: X-Ray Elbow Complete Left  Narrative: EXAMINATION:  XR ELBOW COMPLETE 3 VIEW LEFT    CLINICAL HISTORY:  XR ELBOW COMPLETE 3 VIEW LEFTPain in unspecified elbow    COMPARISON:  None    FINDINGS:  Four views of the left elbow were obtained.    No evidence of acute fracture or dislocation.  Bony mineralization is normal.  Soft tissues are unremarkable.   No joint effusion.  Impression: No acute fracture or dislocation.    Electronically signed by: Talib Royal MD  Date:    05/06/2019  Time:    07:27       Radiographs / Imaging : Relevant imaging results reviewed by me and interpreted by me, discussed with the patient and / or family -radiographs reviewed by me and I would discuss these and viewed them with the pain and hip family member friend in discussing no evidence of acute fracture dislocation and no significant degenerative changes except that there is small bone spur at the superior patella/quadriceps tendon interface.      Assessment:     Encounter Diagnoses   Name Primary?    Left knee pain, unspecified chronicity Yes    History of left knee surgery     Anxiety         Plan:   Left knee pain, unspecified chronicity    History of left knee surgery    Anxiety        The patient verbalized good understanding of the medical issues discussed today and expressed appreciation for the care provided.  Patient was given the opportunity to ask questions and be an active participant in their medical care. Patient had no further questions or concerns at this time.     The patient was encouraged to participate in appropriate physical activity.      Earl Hawthorne M.D.  [facility]        This note was dictated using  voice recognition software and may have sound a like errors.

## 2020-09-24 NOTE — PATIENT INSTRUCTIONS
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    History of left knee surgery Yes        Plan:   [facility]   MRI left knee to assess cartilage      Follow-up: after MRI or sooner if there are any problems between now and then.    Thank you for choosing [facility] and Dr. Jeffery Hawthorne for your orthopedic & sports medicine care. It is our goal to provide you with exceptional care that will help keep you healthy, active, and get you back in the game.    If you felt that you received exemplary care today, please consider leaving us feedback on [employer] at [email or web address]     Please do not hesitate to reach out to us via email, phone, or MyChart with any questions, concerns, or feedback.    If you are experiencing pain/discomfort ,or have questions after 5pm and would like to be connected to the [facility] on-call team, please call this number and specify which Sports Medicine provider is treating you: [phone]

## 2020-09-24 NOTE — LETTER
September 28, 2020      Earl Hawthorne MD  [address] [place] [address]           [facility]  [address] [place] [address]  Phone: [phone]  Fax: [phone]          Patient: Calin Araya   MR Number: [id number]   YOB: 1985   Date of Visit: 9/24/2020       Dear Dr. Earl Hawthorne:    Thank you for referring Calin Araya to me for evaluation. Attached you will find relevant portions of my assessment and plan of care.    If you have questions, please do not hesitate to call me. I look forward to following Calin Araya along with you.    Sincerely,    Jeffery Hawthorne MD    Enclosure  CC:  No Recipients    If you would like to receive this communication electronically, please contact [email or web address] or [phone] to request more information on [employer] Link access.    For providers and/or their staff who would like to refer a patient to [facility], please contact us through our one-stop-shop provider referral line, [facility] , at [phone].    If you feel you have received this communication in error or would no longer like to receive these types of communications, please e-mail [email or web address]

## 2020-09-29 ENCOUNTER — HOSPITAL ENCOUNTER (OUTPATIENT)
Dept: RADIOLOGY | Facility: HOSPITAL | Age: 35
Discharge: HOME | End: 2020-09-29
Attending: ORTHOPAEDIC SURGERY
Payer: COMMERCIAL

## 2020-09-29 DIAGNOSIS — Z98.890 HISTORY OF LEFT KNEE SURGERY: ICD-10-CM

## 2020-09-29 PROCEDURE — 73721 MRI KNEE WITHOUT CONTRAST LEFT: ICD-10-PCS | Mod: 26,LT,, | Performed by: RADIOLOGY

## 2020-09-29 PROCEDURE — 73721 MRI JNT OF LWR EXTRE W/O DYE: CPT | Mod: 26,LT,, | Performed by: RADIOLOGY

## 2020-09-29 PROCEDURE — 73721 MRI JNT OF LWR EXTRE W/O DYE: CPT | Mod: TC,LT

## 2020-11-05 ENCOUNTER — OFFICE VISIT (OUTPATIENT)
Dept: ORTHOPEDICS | Facility: CLINIC | Age: 35
End: 2020-11-05
Payer: COMMERCIAL

## 2020-11-05 VITALS
WEIGHT: 181 LBS | SYSTOLIC BLOOD PRESSURE: 122 MMHG | HEART RATE: 65 BPM | HEIGHT: 74 IN | DIASTOLIC BLOOD PRESSURE: 75 MMHG | BODY MASS INDEX: 23.23 KG/M2

## 2020-11-05 DIAGNOSIS — M76.52 PATELLAR TENDONITIS OF LEFT KNEE: ICD-10-CM

## 2020-11-05 DIAGNOSIS — Z98.890 HISTORY OF LEFT KNEE SURGERY: Primary | ICD-10-CM

## 2020-11-05 DIAGNOSIS — M67.52 PLICA OF KNEE, LEFT: ICD-10-CM

## 2020-11-05 DIAGNOSIS — M76.899 QUADRICEPS TENDONITIS: ICD-10-CM

## 2020-11-05 DIAGNOSIS — M25.562 LEFT KNEE PAIN, UNSPECIFIED CHRONICITY: ICD-10-CM

## 2020-11-05 DIAGNOSIS — S83.242D OTHER TEAR OF MEDIAL MENISCUS OF LEFT KNEE AS CURRENT INJURY, SUBSEQUENT ENCOUNTER: ICD-10-CM

## 2020-11-05 PROCEDURE — 99213 PR OFFICE/OUTPT VISIT, EST, LEVL III, 20-29 MIN: ICD-10-PCS | Mod: S$PBB,,, | Performed by: ORTHOPAEDIC SURGERY

## 2020-11-05 PROCEDURE — 99999 PR PBB SHADOW E&M-EST. PATIENT-LVL IV: ICD-10-PCS | Mod: PBBFAC,,, | Performed by: ORTHOPAEDIC SURGERY

## 2020-11-05 PROCEDURE — 99999 PR PBB SHADOW E&M-EST. PATIENT-LVL IV: CPT | Mod: PBBFAC,,, | Performed by: ORTHOPAEDIC SURGERY

## 2020-11-05 PROCEDURE — 99213 OFFICE O/P EST LOW 20 MIN: CPT | Mod: S$PBB,,, | Performed by: ORTHOPAEDIC SURGERY

## 2020-11-05 PROCEDURE — 99214 OFFICE O/P EST MOD 30 MIN: CPT | Mod: PBBFAC | Performed by: ORTHOPAEDIC SURGERY

## 2020-11-05 RX ORDER — BUDESONIDE 0.5 MG/2ML
INHALANT ORAL
COMMUNITY
Start: 2020-03-09

## 2020-11-05 RX ORDER — SALICYLIC ACID 6 MG/100ML
SHAMPOO TOPICAL
Status: ON HOLD | COMMUNITY
Start: 2020-03-17 | End: 2023-02-17 | Stop reason: HOSPADM

## 2022-10-31 ENCOUNTER — TELEPHONE (OUTPATIENT)
Dept: GASTROENTEROLOGY | Facility: CLINIC | Age: 37
End: 2022-10-31
Payer: OTHER GOVERNMENT

## 2022-10-31 NOTE — TELEPHONE ENCOUNTER
Several attempts made to contact patient to reschedule due to provider book out. Unable to reach patient. Phone not accepting calls. Patient appointment canceled.

## 2023-01-26 ENCOUNTER — OFFICE VISIT (OUTPATIENT)
Dept: GASTROENTEROLOGY | Facility: CLINIC | Age: 38
End: 2023-01-26
Payer: OTHER GOVERNMENT

## 2023-01-26 VITALS
BODY MASS INDEX: 23.06 KG/M2 | HEART RATE: 56 BPM | HEIGHT: 74 IN | DIASTOLIC BLOOD PRESSURE: 82 MMHG | WEIGHT: 179.69 LBS | SYSTOLIC BLOOD PRESSURE: 140 MMHG

## 2023-01-26 DIAGNOSIS — R21 SKIN RASH: ICD-10-CM

## 2023-01-26 DIAGNOSIS — R13.10 DYSPHAGIA, UNSPECIFIED TYPE: ICD-10-CM

## 2023-01-26 DIAGNOSIS — R07.89 ATYPICAL CHEST PAIN: ICD-10-CM

## 2023-01-26 DIAGNOSIS — K20.0 EOSINOPHILIC ESOPHAGITIS: Primary | ICD-10-CM

## 2023-01-26 PROCEDURE — 99203 OFFICE O/P NEW LOW 30 MIN: CPT | Mod: S$PBB,,, | Performed by: PHYSICIAN ASSISTANT

## 2023-01-26 PROCEDURE — 99999 PR PBB SHADOW E&M-EST. PATIENT-LVL IV: ICD-10-PCS | Mod: PBBFAC,,, | Performed by: PHYSICIAN ASSISTANT

## 2023-01-26 PROCEDURE — 99214 OFFICE O/P EST MOD 30 MIN: CPT | Mod: PBBFAC | Performed by: PHYSICIAN ASSISTANT

## 2023-01-26 PROCEDURE — 99203 PR OFFICE/OUTPT VISIT, NEW, LEVL III, 30-44 MIN: ICD-10-PCS | Mod: S$PBB,,, | Performed by: PHYSICIAN ASSISTANT

## 2023-01-26 PROCEDURE — 99999 PR PBB SHADOW E&M-EST. PATIENT-LVL IV: CPT | Mod: PBBFAC,,, | Performed by: PHYSICIAN ASSISTANT

## 2023-01-26 NOTE — H&P (VIEW-ONLY)
"Clinic Consult:  [facility] Consultation Note    Reason for Consult:  The primary encounter diagnosis was Eosinophilic esophagitis. Diagnoses of Skin rash, Atypical chest pain, and Dysphagia, unspecified type were also pertinent to this visit.    PCP: Primary Doctor No   [address] / [place] [id number]    CC: No chief complaint on file.      HPI:  This is a 37 y.o. male here for evaluation of the above. [facility]. EGD completed in 2019 due to chest discomfort and dysphagia. Pathology showed EoE. He has been treated with Protonix and swallowed Budesonide. No longer taking Protonix. He has also seen allergy - reports they did allergy testing with no findings. Reports he continues to have "flares" of the EoE. Presents with chest pain (across entire chest), mild trouble swallowing. Reports he takes the Budesonide and almost immediately feels relief. Last episode was over 1 month ago. Also complains of an intermittent rash to his upper extremities and torso. He has been evaluated by dermatology. Rash was biopsied but he is unsure of results. He does not currently have the rash, but feels like it is just lying dormant. When it comes, it is pruritic. He was told this could be related to his EoE. Dupixant was discussed and ordered by his [facility] but was not covered.    Review of Systems   Constitutional:  Negative for activity change, appetite change, chills, diaphoresis, fatigue, fever and unexpected weight change.   HENT:  Positive for trouble swallowing (mild, occasional).    Respiratory:  Negative for cough and shortness of breath.    Cardiovascular:  Positive for chest pain.   Gastrointestinal:  Negative for abdominal distention, abdominal pain, anal bleeding, blood in stool, constipation, diarrhea, nausea and vomiting.   Skin:  Positive for rash.   Neurological:  Negative for dizziness, weakness and light-headedness.   Psychiatric/Behavioral:  Negative for dysphoric " mood. The patient is nervous/anxious.       Medical History:   Past Medical History:   Diagnosis Date    ADD (attention deficit disorder)     Anxiety     Insomnia     PTSD (post-traumatic stress disorder)     PTSD (post-traumatic stress disorder)        Surgical History:   Past Surgical History:   Procedure Laterality Date    CYST REMOVAL      right hand       Family History:    Family History   Problem Relation Age of Onset    Hypertension Father        Social History:   Social History     Socioeconomic History    Marital status:    Tobacco Use    Smoking status: Never    Smokeless tobacco: Never   Substance and Sexual Activity    Alcohol use: Yes    Drug use: No    Sexual activity: Yes     Partners: Female       Allergies:   Review of patient's allergies indicates:   Allergen Reactions    Chicken derived     Hydrocodone Other (See Comments)     Pt says he CAN take Percocet    Milk containing products      EOE    Tramadol        Home Medications:   Current Outpatient Medications on File Prior to Visit   Medication Sig Dispense Refill    budesonide (PULMICORT) 0.5 mg/2 mL nebulizer solution SWALLOW TWO RESPULES BY MOUTH AS DIRECTED ** MIX TWO RESPULES WITH 10 PACKS OF SPLENDA IN A SLURRY AND SWALLOW WHILE LAYING DOWN TWICE A DAY * DO NOT EAT OR DRINK FOR THIRTY MINUTES AFTER **      clonazePAM (KLONOPIN) 1 MG tablet Take 1 mg by mouth 2 (two) times daily as needed for Anxiety.      dextroamphetamine-amphetamine (ADDERALL XR) 20 MG 24 hr capsule Take 20 mg by mouth every morning.      dornase alpha (PULMOZYME) 1 mg/mL nebulizer solution Inhale 2.5 mg into the lungs.      meclizine (ANTIVERT) 25 mg tablet Take 1 tablet (25 mg total) by mouth every 6 (six) hours. 20 tablet 0    meloxicam (MOBIC) 15 MG tablet Take 1 tablet (15 mg total) by mouth once daily. 20 tablet 0    pantoprazole (PROTONIX) 40 MG tablet Take 40 mg by mouth.      salicylic acid 6 % Sham APPLY MODERATE AMOUNT TOPICALLY THREE TIMES A WEEK AS  "SHAMPOO  APPLY TO SCALP, LATHER AND LET SIT 5-10 MIN. REPEAT 3X/WEEK UNTIL SCALP  IS CLEAR. THEN USE WEEKLY AS NEEDED. AS SHAMPOO  APPLY TO SCALP, LATHER AND LET SIT 5-10 MIN. REPEAT 3X/WEEK UNTIL SCALP   IS CLEAR. THEN USE WEEKLY AS NEEDED.      sodium chloride (OCEAN) 0.65 % nasal spray INHALE 2 SPRAYS IN EACH NOSTRIL EVERY 15 MINUTES AS NEEDED FOR CONGESTION      zolpidem (AMBIEN) 10 mg Tab Take 5 mg by mouth nightly as needed.       No current facility-administered medications on file prior to visit.       Physical Exam:  BP (!) 140/82   Pulse (!) 56   Ht 6' 2" (1.88 m)   Wt 81.5 kg (179 lb 10.8 oz)   BMI 23.07 kg/m²   Body mass index is 23.07 kg/m².  Physical Exam  Constitutional:       General: He is not in acute distress.     Appearance: Normal appearance. He is not ill-appearing, toxic-appearing or diaphoretic.   HENT:      Head: Normocephalic and atraumatic.   Eyes:      General: No scleral icterus.     Extraocular Movements: Extraocular movements intact.   Cardiovascular:      Rate and Rhythm: Normal rate and regular rhythm.   Pulmonary:      Effort: Pulmonary effort is normal. No respiratory distress.   Abdominal:      General: Bowel sounds are normal. There is no distension.      Palpations: Abdomen is soft.      Tenderness: There is no abdominal tenderness.   Musculoskeletal:         General: Normal range of motion.      Cervical back: Normal range of motion.   Skin:     General: Skin is warm and dry.      Coloration: Skin is not jaundiced or pale.      Findings: No bruising, erythema, lesion or rash.   Neurological:      Mental Status: He is alert and oriented to person, place, and time.         Labs: Pertinent labs reviewed.  Endoscopy: 2019  CRC Screening: --  Anticoagulation: --    Assessment:  1. Eosinophilic esophagitis    2. Skin rash    3. Atypical chest pain    4. Dysphagia, unspecified type         Recommendations:  -At this point I would recommend repeating EGD, as his last one was " completed 3.5 years ago. I would like to re evaluate the EoE to ensure that is the cause of his intermittent symptoms.   -Not on Protonix, as this was not helpful. Taking swallowed Budesonide as needed which stops symptoms quickly.  -Discussed 6 food elimination diet.   -Rash not present today, but would recommend continued dermatology follow up.   -Revisit with allergy may be warranted if biopsies return positive for EoE.  -Can discuss possibility of Dupixant after EGD.    Eosinophilic esophagitis  -     Case Request Endoscopy: EGD (ESOPHAGOGASTRODUODENOSCOPY)  -     Ambulatory referral/consult to Endo Procedure ; Future; Expected date: 01/27/2023    Skin rash  -     Case Request Endoscopy: EGD (ESOPHAGOGASTRODUODENOSCOPY)  -     Ambulatory referral/consult to Endo Procedure ; Future; Expected date: 01/27/2023    Atypical chest pain    Dysphagia, unspecified type        No follow-ups on file.    Thank you so much for allowing me to participate in the care of Calin Chairez PA-C

## 2023-01-26 NOTE — PROGRESS NOTES
"Clinic Consult:  [facility] Consultation Note    Reason for Consult:  The primary encounter diagnosis was Eosinophilic esophagitis. Diagnoses of Skin rash, Atypical chest pain, and Dysphagia, unspecified type were also pertinent to this visit.    PCP: Primary Doctor No   [address] / [place] [id number]    CC: No chief complaint on file.      HPI:  This is a 37 y.o. male here for evaluation of the above. [facility]. EGD completed in 2019 due to chest discomfort and dysphagia. Pathology showed EoE. He has been treated with Protonix and swallowed Budesonide. No longer taking Protonix. He has also seen allergy - reports they did allergy testing with no findings. Reports he continues to have "flares" of the EoE. Presents with chest pain (across entire chest), mild trouble swallowing. Reports he takes the Budesonide and almost immediately feels relief. Last episode was over 1 month ago. Also complains of an intermittent rash to his upper extremities and torso. He has been evaluated by dermatology. Rash was biopsied but he is unsure of results. He does not currently have the rash, but feels like it is just lying dormant. When it comes, it is pruritic. He was told this could be related to his EoE. Dupixant was discussed and ordered by his [facility] physician but was not covered.    Review of Systems   Constitutional:  Negative for activity change, appetite change, chills, diaphoresis, fatigue, fever and unexpected weight change.   HENT:  Positive for trouble swallowing (mild, occasional).    Respiratory:  Negative for cough and shortness of breath.    Cardiovascular:  Positive for chest pain.   Gastrointestinal:  Negative for abdominal distention, abdominal pain, anal bleeding, blood in stool, constipation, diarrhea, nausea and vomiting.   Skin:  Positive for rash.   Neurological:  Negative for dizziness, weakness and light-headedness.   Psychiatric/Behavioral:  Negative for dysphoric " mood. The patient is nervous/anxious.       Medical History:   Past Medical History:   Diagnosis Date    ADD (attention deficit disorder)     Anxiety     Insomnia     PTSD (post-traumatic stress disorder)     PTSD (post-traumatic stress disorder)        Surgical History:   Past Surgical History:   Procedure Laterality Date    CYST REMOVAL      right hand       Family History:    Family History   Problem Relation Age of Onset    Hypertension Father        Social History:   Social History     Socioeconomic History    Marital status:    Tobacco Use    Smoking status: Never    Smokeless tobacco: Never   Substance and Sexual Activity    Alcohol use: Yes    Drug use: No    Sexual activity: Yes     Partners: Female       Allergies:   Review of patient's allergies indicates:   Allergen Reactions    Chicken derived     Hydrocodone Other (See Comments)     Pt says he CAN take Percocet    Milk containing products      EOE    Tramadol        Home Medications:   Current Outpatient Medications on File Prior to Visit   Medication Sig Dispense Refill    budesonide (PULMICORT) 0.5 mg/2 mL nebulizer solution SWALLOW TWO RESPULES BY MOUTH AS DIRECTED ** MIX TWO RESPULES WITH 10 PACKS OF SPLENDA IN A SLURRY AND SWALLOW WHILE LAYING DOWN TWICE A DAY * DO NOT EAT OR DRINK FOR THIRTY MINUTES AFTER **      clonazePAM (KLONOPIN) 1 MG tablet Take 1 mg by mouth 2 (two) times daily as needed for Anxiety.      dextroamphetamine-amphetamine (ADDERALL XR) 20 MG 24 hr capsule Take 20 mg by mouth every morning.      dornase alpha (PULMOZYME) 1 mg/mL nebulizer solution Inhale 2.5 mg into the lungs.      meclizine (ANTIVERT) 25 mg tablet Take 1 tablet (25 mg total) by mouth every 6 (six) hours. 20 tablet 0    meloxicam (MOBIC) 15 MG tablet Take 1 tablet (15 mg total) by mouth once daily. 20 tablet 0    pantoprazole (PROTONIX) 40 MG tablet Take 40 mg by mouth.      salicylic acid 6 % Sham APPLY MODERATE AMOUNT TOPICALLY THREE TIMES A WEEK AS  "SHAMPOO  APPLY TO SCALP, LATHER AND LET SIT 5-10 MIN. REPEAT 3X/WEEK UNTIL SCALP  IS CLEAR. THEN USE WEEKLY AS NEEDED. AS SHAMPOO  APPLY TO SCALP, LATHER AND LET SIT 5-10 MIN. REPEAT 3X/WEEK UNTIL SCALP   IS CLEAR. THEN USE WEEKLY AS NEEDED.      sodium chloride (OCEAN) 0.65 % nasal spray INHALE 2 SPRAYS IN EACH NOSTRIL EVERY 15 MINUTES AS NEEDED FOR CONGESTION      zolpidem (AMBIEN) 10 mg Tab Take 5 mg by mouth nightly as needed.       No current facility-administered medications on file prior to visit.       Physical Exam:  BP (!) 140/82   Pulse (!) 56   Ht 6' 2" (1.88 m)   Wt 81.5 kg (179 lb 10.8 oz)   BMI 23.07 kg/m²   Body mass index is 23.07 kg/m².  Physical Exam  Constitutional:       General: He is not in acute distress.     Appearance: Normal appearance. He is not ill-appearing, toxic-appearing or diaphoretic.   HENT:      Head: Normocephalic and atraumatic.   Eyes:      General: No scleral icterus.     Extraocular Movements: Extraocular movements intact.   Cardiovascular:      Rate and Rhythm: Normal rate and regular rhythm.   Pulmonary:      Effort: Pulmonary effort is normal. No respiratory distress.   Abdominal:      General: Bowel sounds are normal. There is no distension.      Palpations: Abdomen is soft.      Tenderness: There is no abdominal tenderness.   Musculoskeletal:         General: Normal range of motion.      Cervical back: Normal range of motion.   Skin:     General: Skin is warm and dry.      Coloration: Skin is not jaundiced or pale.      Findings: No bruising, erythema, lesion or rash.   Neurological:      Mental Status: He is alert and oriented to person, place, and time.         Labs: Pertinent labs reviewed.  Endoscopy: 2019  CRC Screening: --  Anticoagulation: --    Assessment:  1. Eosinophilic esophagitis    2. Skin rash    3. Atypical chest pain    4. Dysphagia, unspecified type         Recommendations:  -At this point I would recommend repeating EGD, as his last one was " completed 3.5 years ago. I would like to re evaluate the EoE to ensure that is the cause of his intermittent symptoms.   -Not on Protonix, as this was not helpful. Taking swallowed Budesonide as needed which stops symptoms quickly.  -Discussed 6 food elimination diet.   -Rash not present today, but would recommend continued dermatology follow up.   -Revisit with allergy may be warranted if biopsies return positive for EoE.  -Can discuss possibility of Dupixant after EGD.    Eosinophilic esophagitis  -     Case Request Endoscopy: EGD (ESOPHAGOGASTRODUODENOSCOPY)  -     Ambulatory referral/consult to Endo Procedure ; Future; Expected date: 01/27/2023    Skin rash  -     Case Request Endoscopy: EGD (ESOPHAGOGASTRODUODENOSCOPY)  -     Ambulatory referral/consult to Endo Procedure ; Future; Expected date: 01/27/2023    Atypical chest pain    Dysphagia, unspecified type        No follow-ups on file.    Thank you so much for allowing me to participate in the care of Calin Chairez PA-C

## 2023-01-31 ENCOUNTER — HOSPITAL ENCOUNTER (OUTPATIENT)
Dept: PREADMISSION TESTING | Facility: HOSPITAL | Age: 38
Discharge: HOME | End: 2023-01-31
Attending: PHYSICIAN ASSISTANT
Payer: OTHER GOVERNMENT

## 2023-01-31 DIAGNOSIS — K20.0 EOSINOPHILIC ESOPHAGITIS: ICD-10-CM

## 2023-01-31 DIAGNOSIS — R21 SKIN RASH: ICD-10-CM

## 2023-02-17 ENCOUNTER — ANESTHESIA (OUTPATIENT)
Dept: ENDOSCOPY | Facility: HOSPITAL | Age: 38
End: 2023-02-17
Payer: OTHER GOVERNMENT

## 2023-02-17 ENCOUNTER — ANESTHESIA EVENT (OUTPATIENT)
Dept: ENDOSCOPY | Facility: HOSPITAL | Age: 38
End: 2023-02-17
Payer: OTHER GOVERNMENT

## 2023-02-17 ENCOUNTER — HOSPITAL ENCOUNTER (OUTPATIENT)
Facility: HOSPITAL | Age: 38
Discharge: HOME | End: 2023-02-17
Attending: INTERNAL MEDICINE | Admitting: INTERNAL MEDICINE
Payer: OTHER GOVERNMENT

## 2023-02-17 VITALS
HEART RATE: 50 BPM | HEIGHT: 74 IN | RESPIRATION RATE: 16 BRPM | WEIGHT: 180 LBS | SYSTOLIC BLOOD PRESSURE: 122 MMHG | BODY MASS INDEX: 23.1 KG/M2 | OXYGEN SATURATION: 99 % | TEMPERATURE: 98 F | DIASTOLIC BLOOD PRESSURE: 73 MMHG

## 2023-02-17 DIAGNOSIS — K22.2 SCHATZKI'S RING OF DISTAL ESOPHAGUS: Primary | ICD-10-CM

## 2023-02-17 DIAGNOSIS — K20.0 EOSINOPHILIC ESOPHAGITIS: ICD-10-CM

## 2023-02-17 PROCEDURE — 27201012 HC FORCEPS, HOT/COLD, DISP: Performed by: INTERNAL MEDICINE

## 2023-02-17 PROCEDURE — 43239 EGD BIOPSY SINGLE/MULTIPLE: CPT | Mod: ,,, | Performed by: INTERNAL MEDICINE

## 2023-02-17 PROCEDURE — 88305 TISSUE EXAM BY PATHOLOGIST: CPT | Mod: 26,,, | Performed by: PATHOLOGY

## 2023-02-17 PROCEDURE — C1726 CATH, BAL DIL, NON-VASCULAR: HCPCS | Performed by: INTERNAL MEDICINE

## 2023-02-17 PROCEDURE — 25000003 PHARM REV CODE 250: Performed by: INTERNAL MEDICINE

## 2023-02-17 PROCEDURE — 43239 PR EGD, FLEX, W/BIOPSY, SGL/MULTI: ICD-10-PCS | Mod: ,,, | Performed by: INTERNAL MEDICINE

## 2023-02-17 PROCEDURE — 00731 ANES UPR GI NDSC PX NOS: CPT | Performed by: INTERNAL MEDICINE

## 2023-02-17 PROCEDURE — 37000009 HC ANESTHESIA EA ADD 15 MINS: Performed by: INTERNAL MEDICINE

## 2023-02-17 PROCEDURE — 88305 TISSUE EXAM BY PATHOLOGIST: CPT | Performed by: PATHOLOGY

## 2023-02-17 PROCEDURE — 43239 EGD BIOPSY SINGLE/MULTIPLE: CPT | Performed by: INTERNAL MEDICINE

## 2023-02-17 PROCEDURE — 88305 TISSUE EXAM BY PATHOLOGIST: ICD-10-PCS | Mod: 26,,, | Performed by: PATHOLOGY

## 2023-02-17 PROCEDURE — 37000008 HC ANESTHESIA 1ST 15 MINUTES: Performed by: INTERNAL MEDICINE

## 2023-02-17 PROCEDURE — 25000003 PHARM REV CODE 250: Performed by: NURSE ANESTHETIST, CERTIFIED REGISTERED

## 2023-02-17 PROCEDURE — 63600175 PHARM REV CODE 636 W HCPCS: Performed by: NURSE ANESTHETIST, CERTIFIED REGISTERED

## 2023-02-17 RX ORDER — PANTOPRAZOLE SODIUM 40 MG/1
40 TABLET, DELAYED RELEASE ORAL 2 TIMES DAILY
Qty: 180 TABLET | Refills: 0 | Status: SHIPPED | OUTPATIENT
Start: 2023-02-17 | End: 2023-05-18

## 2023-02-17 RX ORDER — DEXTROMETHORPHAN/PSEUDOEPHED 2.5-7.5/.8
DROPS ORAL
Status: DISCONTINUED | OUTPATIENT
Start: 2023-02-17 | End: 2023-02-17 | Stop reason: HOSPADM

## 2023-02-17 RX ORDER — PROPOFOL 10 MG/ML
VIAL (ML) INTRAVENOUS
Status: DISCONTINUED | OUTPATIENT
Start: 2023-02-17 | End: 2023-02-17

## 2023-02-17 RX ORDER — LIDOCAINE HYDROCHLORIDE 20 MG/ML
INJECTION INTRAVENOUS
Status: DISCONTINUED | OUTPATIENT
Start: 2023-02-17 | End: 2023-02-17

## 2023-02-17 RX ADMIN — LIDOCAINE HYDROCHLORIDE 100 MG: 20 INJECTION INTRAVENOUS at 01:02

## 2023-02-17 RX ADMIN — PROPOFOL 50 MG: 10 INJECTION, EMULSION INTRAVENOUS at 01:02

## 2023-02-17 RX ADMIN — PROPOFOL 30 MG: 10 INJECTION, EMULSION INTRAVENOUS at 01:02

## 2023-02-17 RX ADMIN — SODIUM CHLORIDE, POTASSIUM CHLORIDE, SODIUM LACTATE AND CALCIUM CHLORIDE: 600; 310; 30; 20 INJECTION, SOLUTION INTRAVENOUS at 01:02

## 2023-02-17 RX ADMIN — PROPOFOL 150 MG: 10 INJECTION, EMULSION INTRAVENOUS at 01:02

## 2023-02-17 NOTE — INTERVAL H&P NOTE
The patient has been examined and the H&P has been reviewed:    I concur with the findings and changes have been noted since the H&P was written: Formally diagnosed with EoE in 2020 but has had symptoms since his early 20's. No previous dilation. Has been off PPI since 2021. Liquid budesonide helps.      Anesthesia/Surgery risks, benefits and alternative options discussed and understood by patient/family.    The risks, benefits and alternatives of the procedure were discussed with the patient in detail. This discussion was had in the presence of endo staff. The risks include, risks of adverse reaction to sedation requiring the use of reversal agents, bleeding requiring blood transfusion, perforation requiring surgical intervention and technical failure. Other risks include aspiration leading to respiratory distress and respiratory failure resulting in endotracheal intubation and mechanical ventilation including death. If anesthesia is being utilized for this procedure, it is up to the anesthesiologist to determine airway safety including elective endotracheal intubation. Questions were answered, they agree to proceed. There was no language barriers.

## 2023-02-17 NOTE — ANESTHESIA PREPROCEDURE EVALUATION
02/17/2023  Calin Araya is a 37 y.o., male.      Pre-op Assessment    I have reviewed the Patient Summary Reports.     I have reviewed the Nursing Notes. I have reviewed the NPO Status.      Review of Systems  Hepatic/GI:   GERD  Esophageal / Stomach Disorders Esophageal Disorder ( Eosinophilic esophagitis )    Musculoskeletal:   Arthritis     Psych:   Psychiatric History anxiety          Physical Exam  General: Well nourished, Cooperative and Alert    Airway:  Mallampati: II   Mouth Opening: Normal  Tongue: Normal  Neck ROM: Normal ROM    Dental:  Intact        Anesthesia Plan  Type of Anesthesia, risks & benefits discussed:    Anesthesia Type: MAC  Intra-op Monitoring Plan: Standard ASA Monitors  Induction:  IV  Informed Consent: Informed consent signed with the Patient and all parties understand the risks and agree with anesthesia plan.  All questions answered.   ASA Score: 1    Ready For Surgery From Anesthesia Perspective.     .

## 2023-02-17 NOTE — TRANSFER OF CARE
"Anesthesia Transfer of Care Note    Patient: Calin Araya    Procedure(s) Performed: Procedure(s) (LRB):  EGD (ESOPHAGOGASTRODUODENOSCOPY) (N/A)    Patient location: GI    Anesthesia Type: MAC    Transport from OR: Transported from OR on room air with adequate spontaneous ventilation    Post pain: adequate analgesia    Post assessment: no apparent anesthetic complications    Post vital signs: stable    Level of consciousness: awake, alert and oriented    Nausea/Vomiting: no nausea/vomiting    Complications: none    Transfer of care protocol was followed      Last vitals:   Visit Vitals  /73 (BP Location: Left arm, Patient Position: Lying)   Pulse 61   Temp 36.9 °C (98.4 °F) (Tympanic)   Resp 18   Ht 6' 2" (1.88 m)   Wt 81.6 kg (180 lb)   SpO2 99%   BMI 23.11 kg/m²     "

## 2023-02-17 NOTE — [employer] PATIENT INSTRUCTIONS
Discharge Summary/Instructions after an Endoscopic Procedure  Patient Name: Calin Araya  Patient MRN: [id number]  Patient YOB: 1985 Friday, February 17, 2023 Kriss Dorsey MD  Dear patient,  As a result of recent federal legislation (The Federal Cures Act), you may   receive lab or pathology results from your procedure in your [facility]   account before your physician is able to contact you. Your physician or   their representative will relay the results to you with their   recommendations at their soonest availability.  Thank you,  RESTRICTIONS:  During your procedure today, you received medications for sedation.  These   medications may affect your judgment, balance and coordination.  Therefore,   for 24 hours, you have the following restrictions:   - DO NOT drive a car, operate machinery, make legal/financial decisions,   sign important papers or drink alcohol.    ACTIVITY:  Today: no heavy lifting, straining or running due to procedural   sedation/anesthesia.  The following day: return to full activity including work.  DIET:  Eat and drink normally unless instructed otherwise.     TREATMENT FOR COMMON SIDE EFFECTS:  - Mild abdominal pain, nausea, belching, bloating or excessive gas:  rest,   eat lightly and use a heating pad.  - Sore Throat: treat with throat lozenges and/or gargle with warm salt   water.  - Because air was used during the procedure, expelling large amounts of air   from your rectum or belching is normal.  - If a bowel prep was taken, you may not have a bowel movement for 1-3 days.    This is normal.  SYMPTOMS TO WATCH FOR AND REPORT TO YOUR PHYSICIAN:  1. Abdominal pain or bloating, other than gas cramps.  2. Chest pain.  3. Back pain.  4. Signs of infection such as: chills or fever occurring within 24 hours   after the procedure.  5. Rectal bleeding, which would show as bright red, maroon, or black stools.   (A tablespoon of blood from the rectum is not serious, especially if    hemorrhoids are present.)  6. Vomiting.  7. Weakness or dizziness.  GO DIRECTLY TO THE NEAREST EMERGENCY ROOM IF YOU HAVE ANY OF THE FOLLOWING:      Difficulty breathing              Chills and/or fever over 101 F   Persistent vomiting and/or vomiting blood   Severe abdominal pain   Severe chest pain   Black, tarry stools   Bleeding- more than one tablespoon   Any other symptom or condition that you feel may need urgent attention  Your doctor recommends these additional instructions:  If any biopsies were taken, your doctors clinic will contact you in 1 to 2   weeks with any results.  - Discharge patient to home (with escort).   - Clear liquid diet today.   - Continue present medications.   - Await pathology results.   - Use Protonix (pantoprazole) 40 mg PO BID.   - Continue Budesonide  - Observe patient's clinical course.   - Return to GI clinic with Ms. Kiarra Chairez PA-C if symptoms persist.  For questions, problems or results please call your physician Kriss Dorsey MD at Work:  [phone]  If you have any questions about the above instructions, call the GI   department at [phone] or call the endoscopy unit at [phone]   from 7am until 3 pm.  [facility] [place], EMERGENCY ROOM PHONE NUMBER:   [phone]  IF A COMPLICATION OR EMERGENCY SITUATION ARISES AND YOU ARE UNABLE TO REACH   YOUR PHYSICIAN - GO DIRECTLY TO THE EMERGENCY ROOM.  I have read or have had read to me these discharge instructions for my   procedure and have received a written copy.  I understand these   instructions and will follow-up with my physician if I have any questions.     __________________________________       _____________________________________  Nurse Signature                                          Patient/Designated   Responsible Party Signature  MD Kriss Washington MD  2/17/2023 1:38:52 PM  This report has been verified and signed electronically.  Dear patient,  As a  result of recent federal legislation (The Federal Cures Act), you may   receive lab or pathology results from your procedure in your [facility]   account before your physician is able to contact you. Your physician or   their representative will relay the results to you with their   recommendations at their soonest availability.  Thank you,  PROVATION

## 2023-02-17 NOTE — ANESTHESIA POSTPROCEDURE EVALUATION
Anesthesia Post Evaluation    Patient: Calin Araya    Procedure(s) Performed: Procedure(s) (LRB):  EGD (ESOPHAGOGASTRODUODENOSCOPY) (N/A)    Final Anesthesia Type: MAC      Patient location during evaluation: GI PACU  Patient participation: Yes- Able to Participate  Level of consciousness: awake and alert  Post-procedure vital signs: reviewed and stable  Pain management: adequate  Airway patency: patent  ALPHONSE mitigation strategies: Multimodal analgesia  PONV status at discharge: No PONV  Anesthetic complications: no      Cardiovascular status: hemodynamically stable  Respiratory status: unassisted, room air and spontaneous ventilation  Hydration status: euvolemic  Follow-up not needed.          Vitals Value Taken Time   /73 02/17/23 1355   Temp 36.7 °C (98.1 °F) 02/17/23 1335   Pulse 50 02/17/23 1355   Resp 16 02/17/23 1355   SpO2 99 % 02/17/23 1355         Event Time   Out of Recovery 14:03:01         Pain/Cris Score: Cris Score: 10 (2/17/2023  1:55 PM)

## 2023-02-24 LAB
FINAL PATHOLOGIC DIAGNOSIS: NORMAL
Lab: NORMAL

## 2023-02-27 NOTE — PROGRESS NOTES
The biopsies of your swallowing pipe are consistent with EoE that you were previously diagnosed with. Please take the Protonix twice a day and the budesonide as well. Continue this therapy for two months then decrease the Protonix to once a day. We hope the dilation was helpful. Please let us know how we can help in the future.

## 2024-09-12 ENCOUNTER — TELEPHONE (OUTPATIENT)
Dept: GASTROENTEROLOGY | Facility: CLINIC | Age: 39
End: 2024-09-12
Payer: OTHER GOVERNMENT

## 2024-09-12 NOTE — TELEPHONE ENCOUNTER
----- Message from Paola Hurtado sent at 9/12/2024 10:24 AM CDT -----    Name of Caller:Patient's wife  When is the first available appointment?today  Symptoms:gi issues that he still having  Best Call Back Number:.[phone] or [phone]  Additional Information: They traveled out of town for [place] and tried to call the clinic to see if we were open but was unsuccessful she said. He would like an appointment today if possible or soon as possible.

## 2024-10-23 ENCOUNTER — OFFICE VISIT (OUTPATIENT)
Dept: GASTROENTEROLOGY | Facility: CLINIC | Age: 39
End: 2024-10-23
Payer: OTHER GOVERNMENT

## 2024-10-23 VITALS
SYSTOLIC BLOOD PRESSURE: 124 MMHG | BODY MASS INDEX: 27.7 KG/M2 | HEART RATE: 75 BPM | HEIGHT: 74 IN | DIASTOLIC BLOOD PRESSURE: 76 MMHG | WEIGHT: 215.81 LBS

## 2024-10-23 DIAGNOSIS — K20.0 EOSINOPHILIC ESOPHAGITIS: Primary | ICD-10-CM

## 2024-10-23 DIAGNOSIS — K22.4 ESOPHAGEAL DYSFUNCTION: ICD-10-CM

## 2024-10-23 PROCEDURE — 99999 PR PBB SHADOW E&M-EST. PATIENT-LVL IV: CPT | Mod: PBBFAC,,, | Performed by: NURSE PRACTITIONER

## 2024-10-23 PROCEDURE — 99214 OFFICE O/P EST MOD 30 MIN: CPT | Mod: PBBFAC | Performed by: NURSE PRACTITIONER

## 2024-10-23 PROCEDURE — 99214 OFFICE O/P EST MOD 30 MIN: CPT | Mod: S$PBB,,, | Performed by: NURSE PRACTITIONER

## 2024-10-23 RX ORDER — DUPILUMAB 300 MG/2ML
300 INJECTION, SOLUTION SUBCUTANEOUS
Qty: 8 ML | Refills: 11 | Status: SHIPPED | OUTPATIENT
Start: 2024-10-23 | End: 2024-10-23

## 2024-10-23 RX ORDER — PANTOPRAZOLE SODIUM 40 MG/1
40 TABLET, DELAYED RELEASE ORAL DAILY
Qty: 90 TABLET | Refills: 3 | Status: SHIPPED | OUTPATIENT
Start: 2024-10-23 | End: 2025-10-23

## 2024-10-23 NOTE — PROGRESS NOTES
Clinic Consult:  [facility] Consultation Note    Reason for Consult:  The primary encounter diagnosis was Eosinophilic esophagitis. A diagnosis of Esophageal dysfunction was also pertinent to this visit.    PCP: Olamide, Primary Doctor   [address] / [place] [address]    HPI:  This is a 39 y.o. male here for evaluation of EoE.   [facility]   He was diagnosed with this on EGD in 2019.  Confirmed on pathology.  He reports being on Protonix and swallowed budesonide but felt this did not help him any so he self discontinued it.  He had repeat EGD off PPI with moderate Schatzki ring and findings consistent with eosinophilic esophagitis.  Pathology was also consistent with up to 40 eosinophils per HPF. He was instructed to restart PPI at twice a day for 2 months then decrease to once a day. He did not take pantoprazole. He will still use Budesonide slurry PRN. In notes from last visit, there was previous discussion of Dupixent but was not approved by the [facility].     Review of Systems   Constitutional:  Negative for fever and weight loss.   HENT:  Negative for sore throat.    Respiratory:  Negative for cough, shortness of breath and wheezing.    Cardiovascular:  Negative for chest pain and palpitations.   Gastrointestinal:  Negative for abdominal pain, blood in stool, constipation, diarrhea and melena.        Dysphagia    Genitourinary:  Negative for dysuria and frequency.   Skin:  Negative for itching and rash.       Medical History:  has a past medical history of ADD (attention deficit disorder), Anxiety, Insomnia, PTSD (post-traumatic stress disorder), and PTSD (post-traumatic stress disorder).    Surgical History:  has a past surgical history that includes Cyst Removal and Esophagogastroduodenoscopy (N/A, 2/17/2023).    Family History: family history includes Hypertension in his father..     Social History:  reports that he has never smoked. He has never used smokeless tobacco. He  "reports current alcohol use. He reports that he does not use drugs.    Allergies: Reviewed    Home Medications:   Current Outpatient Medications on File Prior to Visit   Medication Sig Dispense Refill    budesonide (PULMICORT) 0.5 mg/2 mL nebulizer solution SWALLOW TWO RESPULES BY MOUTH AS DIRECTED ** MIX TWO RESPULES WITH 10 PACKS OF SPLENDA IN A SLURRY AND SWALLOW WHILE LAYING DOWN TWICE A DAY * DO NOT EAT OR DRINK FOR THIRTY MINUTES AFTER **      dextroamphetamine-amphetamine (ADDERALL XR) 20 MG 24 hr capsule Take 20 mg by mouth every morning.      meclizine (ANTIVERT) 25 mg tablet Take 1 tablet (25 mg total) by mouth every 6 (six) hours. 20 tablet 0    sodium chloride (OCEAN) 0.65 % nasal spray INHALE 2 SPRAYS IN EACH NOSTRIL EVERY 15 MINUTES AS NEEDED FOR CONGESTION (Patient not taking: Reported on 10/23/2024)      zolpidem (AMBIEN) 10 mg Tab Take 5 mg by mouth nightly as needed. (Patient not taking: Reported on 10/23/2024)      [DISCONTINUED] pantoprazole (PROTONIX) 40 MG tablet Take 1 tablet (40 mg total) by mouth 2 (two) times daily. 180 tablet 0     No current facility-administered medications on file prior to visit.       Physical Exam:  /76 (BP Location: Right arm, Patient Position: Sitting)   Pulse 75   Ht 6' 2" (1.88 m)   Wt 97.9 kg (215 lb 13.3 oz)   BMI 27.71 kg/m²   Body mass index is 27.71 kg/m².  Physical Exam  Constitutional:       General: He is not in acute distress.  HENT:      Head: Normocephalic.   Neurological:      General: No focal deficit present.      Mental Status: He is alert.   Psychiatric:         Mood and Affect: Mood normal.         Judgment: Judgment normal.         Labs: Pertinent labs reviewed.  CRC Screening:     Assessment:  1. Eosinophilic esophagitis    2. Esophageal dysfunction      Patient with EoE esophagitis and dysphagia. Also with chest pain when swallowing. He reports no clinical improvement with PPI use but we discussed the importance of objective data to " measure the effectiveness of PPI.     Recommendations:   - restart PPI daily   - EGD in 3 months  - if still with EoE, will attempt starting Dupixent. If Dupixent not approved, will have to increase PPI to BID with Budesonide slurries.     Eosinophilic esophagitis  -     pantoprazole (PROTONIX) 40 MG tablet; Take 1 tablet (40 mg total) by mouth once daily.  Dispense: 90 tablet; Refill: 3  -     Ambulatory referral/consult to Endo Procedure     Esophageal dysfunction  -     pantoprazole (PROTONIX) 40 MG tablet; Take 1 tablet (40 mg total) by mouth once daily.  Dispense: 90 tablet; Refill: 3  -     Ambulatory referral/consult to Endo Procedure     Follow up to be determined after results/ procedure(s).    Thank you so much for allowing me to participate in the care of KATHERINE Woods

## 2024-10-24 ENCOUNTER — PATIENT MESSAGE (OUTPATIENT)
Dept: RESEARCH | Facility: HOSPITAL | Age: 39
End: 2024-10-24
Payer: OTHER GOVERNMENT

## 2024-10-24 ENCOUNTER — HOSPITAL ENCOUNTER (OUTPATIENT)
Dept: PREADMISSION TESTING | Facility: HOSPITAL | Age: 39
Discharge: HOME | End: 2024-10-24
Attending: INTERNAL MEDICINE
Payer: OTHER GOVERNMENT

## 2024-12-11 ENCOUNTER — HOSPITAL ENCOUNTER (OUTPATIENT)
Dept: PREADMISSION TESTING | Facility: HOSPITAL | Age: 39
Discharge: HOME | End: 2024-12-11
Attending: INTERNAL MEDICINE
Payer: OTHER GOVERNMENT

## 2024-12-11 DIAGNOSIS — K22.4 ESOPHAGEAL DYSFUNCTION: ICD-10-CM

## 2024-12-11 DIAGNOSIS — K20.0 EOSINOPHILIC ESOPHAGITIS: Primary | ICD-10-CM

## 2025-01-09 ENCOUNTER — TELEPHONE (OUTPATIENT)
Dept: PREADMISSION TESTING | Facility: HOSPITAL | Age: 40
End: 2025-01-09
Payer: OTHER GOVERNMENT

## 2025-01-30 ENCOUNTER — ANESTHESIA (OUTPATIENT)
Dept: ENDOSCOPY | Facility: HOSPITAL | Age: 40
End: 2025-01-30
Payer: OTHER GOVERNMENT

## 2025-01-30 ENCOUNTER — ANESTHESIA EVENT (OUTPATIENT)
Dept: ENDOSCOPY | Facility: HOSPITAL | Age: 40
End: 2025-01-30
Payer: OTHER GOVERNMENT

## 2025-01-30 ENCOUNTER — HOSPITAL ENCOUNTER (OUTPATIENT)
Dept: ENDOSCOPY | Facility: HOSPITAL | Age: 40
Discharge: HOME | End: 2025-01-30
Attending: NURSE PRACTITIONER | Admitting: INTERNAL MEDICINE
Payer: OTHER GOVERNMENT

## 2025-01-30 DIAGNOSIS — K22.4 ESOPHAGEAL DYSFUNCTION: ICD-10-CM

## 2025-01-30 DIAGNOSIS — K20.0 EOSINOPHILIC ESOPHAGITIS: ICD-10-CM

## 2025-01-30 DIAGNOSIS — K22.2 SCHATZKI'S RING OF DISTAL ESOPHAGUS: ICD-10-CM

## 2025-01-30 DIAGNOSIS — K44.9 HIATAL HERNIA: Primary | ICD-10-CM

## 2025-01-30 PROCEDURE — 37000008 HC ANESTHESIA 1ST 15 MINUTES

## 2025-01-30 PROCEDURE — C1726 CATH, BAL DIL, NON-VASCULAR: HCPCS

## 2025-01-30 PROCEDURE — 43249 ESOPH EGD DILATION <30 MM: CPT | Performed by: INTERNAL MEDICINE

## 2025-01-30 PROCEDURE — 88305 TISSUE EXAM BY PATHOLOGIST: CPT | Mod: 26,,, | Performed by: PATHOLOGY

## 2025-01-30 PROCEDURE — 43239 EGD BIOPSY SINGLE/MULTIPLE: CPT | Mod: 59 | Performed by: INTERNAL MEDICINE

## 2025-01-30 PROCEDURE — 88305 TISSUE EXAM BY PATHOLOGIST: CPT | Performed by: PATHOLOGY

## 2025-01-30 PROCEDURE — 27201012 HC FORCEPS, HOT/COLD, DISP

## 2025-01-30 PROCEDURE — 63600175 PHARM REV CODE 636 W HCPCS: Performed by: NURSE ANESTHETIST, CERTIFIED REGISTERED

## 2025-01-30 PROCEDURE — 43239 EGD BIOPSY SINGLE/MULTIPLE: CPT | Mod: 59,,, | Performed by: INTERNAL MEDICINE

## 2025-01-30 PROCEDURE — 43249 ESOPH EGD DILATION <30 MM: CPT | Mod: ,,, | Performed by: INTERNAL MEDICINE

## 2025-01-30 PROCEDURE — 37000009 HC ANESTHESIA EA ADD 15 MINS

## 2025-01-30 RX ORDER — PROPOFOL 10 MG/ML
VIAL (ML) INTRAVENOUS
Status: DISCONTINUED | OUTPATIENT
Start: 2025-01-30 | End: 2025-01-30

## 2025-01-30 RX ORDER — LIDOCAINE HYDROCHLORIDE 10 MG/ML
INJECTION, SOLUTION EPIDURAL; INFILTRATION; INTRACAUDAL; PERINEURAL
Status: DISCONTINUED | OUTPATIENT
Start: 2025-01-30 | End: 2025-01-30

## 2025-01-30 RX ORDER — BUDESONIDE 0.5 MG/2ML
0.5 INHALANT ORAL 2 TIMES DAILY
Qty: 360 ML | Refills: 3 | Status: SHIPPED | OUTPATIENT
Start: 2025-01-30 | End: 2026-01-30

## 2025-01-30 RX ADMIN — PROPOFOL 40 MG: 10 INJECTION, EMULSION INTRAVENOUS at 09:01

## 2025-01-30 RX ADMIN — PROPOFOL 50 MG: 10 INJECTION, EMULSION INTRAVENOUS at 09:01

## 2025-01-30 RX ADMIN — PROPOFOL 100 MG: 10 INJECTION, EMULSION INTRAVENOUS at 09:01

## 2025-01-30 RX ADMIN — LIDOCAINE HYDROCHLORIDE 80 MG: 10 SOLUTION INTRAVENOUS at 09:01

## 2025-01-30 NOTE — PLAN OF CARE
Dr. Dorsey at  to discuss findings. VSS. No  Pain, no GI bleeding. Pt to be discharged from unit.

## 2025-01-30 NOTE — BRIEF OP NOTE
Outpatient Endoscopy Brief Operative Note      Admit Date: 1/30/2025    Discharge Date and Time:  1/30/2025 9:40 AM    Attending Physician: Heidi Colon NP     Discharge Physician: Heidi Colon NP     Principal Admitting Diagnoses: Eosinophilic esophagitis         Discharge Diagnosis: The primary encounter diagnosis was Hiatal hernia. Diagnoses of Eosinophilic esophagitis, Esophageal dysfunction, and Schatzki's ring of distal esophagus were also pertinent to this visit.     Discharged Condition: Good    Indication for Admission: Eosinophilic esophagitis     Hospital Course: Patient was admitted for an outppatient procedure and tolerated the procedure well with no complications.    Significant Diagnostic Studies: EGD with biopsy and EGD with dilation       Pathology (if any):  Specimen (24h ago, onward)       Start     Ordered    01/30/25 0922  Specimen to Pathology, Surgery Gastrointestinal tract  Once        Comments: Pre-op Diagnosis: Eosinophilic esophagitis, Esophageal dysfunctionProcedures: EGD1. Esophageal Bx (Hx of EOE)     References:    Click here for ordering Quick Tip   Question Answer Comment   Procedure Type: Gastrointestinal tract    Release to patient Immediate        01/30/25 0932                    Estimated Blood Loss: 1 ml.    Discussed with: patient.    Disposition: Home.    Follow Up/Patient Instructions:   Patient's Medications   New Prescriptions    LANSOPRAZOLE 3 MG/ML SUSP (PREVACID)    Take 10 mLs (30 mg total) by mouth 2 (two) times a day.   Previous Medications    DEXTROAMPHETAMINE-AMPHETAMINE (ADDERALL XR) 20 MG 24 HR CAPSULE    Take 20 mg by mouth every morning.   Modified Medications    Modified Medication Previous Medication    BUDESONIDE (PULMICORT) 0.5 MG/2 ML NEBULIZER SOLUTION budesonide (PULMICORT) 0.5 mg/2 mL nebulizer solution       Take 2 mLs (0.5 mg total) by nebulization 2 (two) times a day. SWALLOW TWO RESPULES BY MOUTH AS DIRECTED ** MIX TWO RESPULES WITH 10  PACKS OF SPLENDA IN A SLURRY AND SWALLOW WHILE LAYING DOWN TWICE A DAY * DO NOT EAT OR DRINK FOR THIRTY MINUTES AFTER **    SWALLOW TWO RESPULES BY MOUTH AS DIRECTED ** MIX TWO RESPULES WITH 10 PACKS OF SPLENDA IN A SLURRY AND SWALLOW WHILE LAYING DOWN TWICE A DAY * DO NOT EAT OR DRINK FOR THIRTY MINUTES AFTER **   Discontinued Medications    MECLIZINE (ANTIVERT) 25 MG TABLET    Take 1 tablet (25 mg total) by mouth every 6 (six) hours.    PANTOPRAZOLE (PROTONIX) 40 MG TABLET    Take 1 tablet (40 mg total) by mouth once daily.    SODIUM CHLORIDE (OCEAN) 0.65 % NASAL SPRAY    INHALE 2 SPRAYS IN EACH NOSTRIL EVERY 15 MINUTES AS NEEDED FOR CONGESTION    ZOLPIDEM (AMBIEN) 10 MG TAB    Take 5 mg by mouth nightly as needed.       Discharge Procedure Orders   Diet general     Call MD for:  temperature >100.4     Call MD for:  persistent nausea and vomiting     Call MD for:  severe uncontrolled pain     Call MD for:  difficulty breathing, headache or visual disturbances     Activity as tolerated        Follow-up Information       Heidi Colon NP Follow up.    Specialty: Gastroenterology  Contact information:  [address] [address] [phone]                                 Kriss Dorsey MD  Inpatient Gastroenterology  [facility]

## 2025-01-30 NOTE — TRANSFER OF CARE
"Anesthesia Transfer of Care Note    Patient: Calin Araya    Procedure(s) Performed: * No procedures listed *    Patient location: PACU    Anesthesia Type: MAC    Transport from OR: Transported from OR on room air with adequate spontaneous ventilation    Post pain: adequate analgesia    Post assessment: no apparent anesthetic complications    Post vital signs: stable    Level of consciousness: responds to stimulation    Nausea/Vomiting: no nausea/vomiting    Complications: none    Transfer of care protocol was followed      Last vitals: Visit Vitals  /70   Pulse 77   Temp 37 °C (98.6 °F)   Resp 20   Ht 6' 2" (1.88 m)   Wt 99.8 kg (220 lb)   SpO2 99%   BMI 28.25 kg/m²     "

## 2025-01-30 NOTE — H&P
PRE PROCEDURE H&P    Patient Name: Calin Araya  MRN: [id number]  : 1985  Date of Procedure:  2025  Referring Physician: Heidi Colon NP  Primary Physician: Olamide, Primary Doctor  Procedure Physician: Kriss Dorsey MD       Planned Procedure: EGD  Diagnosis:   F/U with EoE    Chief Complaint: Same as above    HPI: Patient is an 39 y.o. male is here for the above. Patient diagnosed with EoE in 2019. He has been managed on Protonix and Budesonide slurries in the past. Last EGD in  by me. Noted to have a moderate size Schaztki's ring and esophageal mucosa changes consistent with EoE. He was placed on Protonix 40mg BID for 2 months then decreased to daily thereafter. He stopped the PPI and followed up with Ms. Heidi Colon NP in 10/2024 for recurrent symptoms. He was again placed on Protonix 40mg BID but states the medicine led to epigastric pain therefore he stopped. Still takes Budesonide slurries 203 times per week. Complains of dysphagia to chicken. Able to induce vomiting. No hematemesis. No weight loss. No blood thinners.       Past Medical History:   Past Medical History:   Diagnosis Date    ADD (attention deficit disorder)     Anxiety     Insomnia     PTSD (post-traumatic stress disorder)     PTSD (post-traumatic stress disorder)         Past Surgical History:  Past Surgical History:   Procedure Laterality Date    CYST REMOVAL      right hand    ESOPHAGOGASTRODUODENOSCOPY N/A 2023    Procedure: EGD (ESOPHAGOGASTRODUODENOSCOPY);  Surgeon: Kriss Dorsey MD;  Location: [facility];  Service: Endoscopy;  Laterality: N/A;        Home Medications:  Prior to Admission medications    Medication Sig Start Date End Date Taking? Authorizing Provider   dextroamphetamine-amphetamine (ADDERALL XR) 20 MG 24 hr capsule Take 20 mg by mouth every morning.   Yes Provider, Historical   budesonide (PULMICORT) 0.5 mg/2 mL nebulizer solution SWALLOW TWO RESPULES BY MOUTH AS DIRECTED ** MIX TWO  "RESPULES WITH 10 PACKS OF SPLENDA IN A SLURRY AND SWALLOW WHILE LAYING DOWN TWICE A DAY * DO NOT EAT OR DRINK FOR THIRTY MINUTES AFTER ** 3/9/20   Provider, Historical   meclizine (ANTIVERT) 25 mg tablet Take 1 tablet (25 mg total) by mouth every 6 (six) hours. 12/16/19   Nadeem Galindo MD   pantoprazole (PROTONIX) 40 MG tablet Take 1 tablet (40 mg total) by mouth once daily. 10/23/24 10/23/25  Heidi Colon, NP   sodium chloride (OCEAN) 0.65 % nasal spray INHALE 2 SPRAYS IN EACH NOSTRIL EVERY 15 MINUTES AS NEEDED FOR CONGESTION  Patient not taking: Reported on 10/23/2024 9/17/20   Provider, Historical   zolpidem (AMBIEN) 10 mg Tab Take 5 mg by mouth nightly as needed.  Patient not taking: Reported on 10/23/2024    Provider, Historical   testosterone cypionate (DEPOTESTOTERONE CYPIONATE) 200 mg/mL injection Inject into the muscle once a week.  1/27/25  Provider, Historical        Allergies:  Review of patient's allergies indicates:   Allergen Reactions    Chicken derived     Hydrocodone Other (See Comments)     Pt says he CAN take Percocet    Milk containing products (dairy)      EOE    Tramadol         Social History:   Social History     Socioeconomic History    Marital status:    Tobacco Use    Smoking status: Never    Smokeless tobacco: Never   Substance and Sexual Activity    Alcohol use: Not Currently    Drug use: No    Sexual activity: Yes     Partners: Female       Family History:  Family History   Problem Relation Name Age of Onset    Hypertension Father         ROS: No acute cardiac events, no acute respiratory complaints.     Physical Exam (all patients):    /70   Pulse 77   Temp 98.6 °F (37 °C)   Resp 20   Ht 6' 2" (1.88 m)   Wt 99.8 kg (220 lb)   SpO2 99%   BMI 28.25 kg/m²   Lungs: Clear to auscultation bilaterally, respirations unlabored  Heart: Regular rate and rhythm, S1 and S2 normal, no obvious murmurs  Abdomen:         Soft, non-tender, bowel sounds normal, no " masses, no organomegaly    Lab Results   Component Value Date    WBC 7.18 12/16/2019    MCV 91 12/16/2019    RDW 11.8 12/16/2019     (L) 12/16/2019    INR 1.1 02/08/2015    GLU 96 12/16/2019    BUN 12 07/01/2020     07/01/2020    K 4.1 07/01/2020     12/16/2019        SEDATION PLAN: per anesthesia      History reviewed, vital signs satisfactory, cardiopulmonary status satisfactory, sedation options, risks and plans have been discussed with the patient  All their questions were answered and the patient agrees to the sedation procedures as planned and the patient is deemed an appropriate candidate for the sedation as planned.    The risks, benefits and alternatives of the procedure were discussed with the patient in detail. This discussion was had in the presence of endoscopy staff. The risks include, risks of adverse reaction to sedation requiring the use of reversal agents, bleeding requiring blood transfusion, perforation requiring surgical intervention and technical failure. Other risks include aspiration leading to respiratory distress and respiratory failure resulting in endotracheal intubation and mechanical ventilation including death. If anesthesia is being utilized for this procedure, it is up to the anesthesiologist to determine airway safety including elective endotracheal intubation. Questions were answered, they agree to proceed. There was no language barriers.      Procedure explained to patient, informed consent obtained and placed in chart.    Kriss Dorsey  1/30/2025  9:11 AM

## 2025-01-30 NOTE — ANESTHESIA POSTPROCEDURE EVALUATION
Anesthesia Post Evaluation    Patient: Calin Araya    Procedure(s) Performed: * No procedures listed *    Final Anesthesia Type: MAC      Patient location during evaluation: PACU  Patient participation: Yes- Able to Participate  Level of consciousness: awake and alert  Post-procedure vital signs: reviewed and stable  Pain management: adequate  Airway patency: patent    PONV status at discharge: No PONV  Anesthetic complications: no      Cardiovascular status: blood pressure returned to baseline  Respiratory status: unassisted and room air  Hydration status: euvolemic  Follow-up not needed.              Vitals Value Taken Time   /75 01/30/25 1007   Temp 36.9 °C (98.4 °F) 01/30/25 0937   Pulse 64 01/30/25 1007   Resp 18 01/30/25 1007   SpO2 99 % 01/30/25 1007         Event Time   Out of Recovery 10:13:18         Pain/Cris Score: Cris Score: 10 (1/30/2025 10:07 AM)

## 2025-01-30 NOTE — ANESTHESIA PREPROCEDURE EVALUATION
01/30/2025  Calin Araya is a 39 y.o., male.      Pre-op Assessment    I have reviewed the Patient Summary Reports.     I have reviewed the Nursing Notes. I have reviewed the NPO Status.   I have reviewed the Medications.     Review of Systems  Anesthesia Hx:  No problems with previous Anesthesia             Denies Family Hx of Anesthesia complications.    Denies Personal Hx of Anesthesia complications.                    Social:  Non-Smoker       Hematology/Oncology:  Hematology Normal   Oncology Normal                                   EENT/Dental:  EENT/Dental Normal           Cardiovascular:  Cardiovascular Normal Exercise tolerance: good                                             Renal/:  Renal/ Normal                 Hepatic/GI:  Hepatic/GI Normal                    Musculoskeletal:  Musculoskeletal Normal                Neurological:  Neurology Normal                                      Endocrine:  Endocrine Normal            Dermatological:  Skin Normal    Psych:  Psychiatric History                  Physical Exam  General: Well nourished, Cooperative, Alert and Oriented    Airway:  Mallampati: II   Mouth Opening: Normal  TM Distance: Normal  Tongue: Normal  Neck ROM: Normal ROM    Dental:  Intact    Chest/Lungs:  Clear to auscultation, Normal Respiratory Rate    Heart:  Rate: Normal  Rhythm: Regular Rhythm        Anesthesia Plan  Type of Anesthesia, risks & benefits discussed:    Anesthesia Type: MAC  Intra-op Monitoring Plan: Standard ASA Monitors  Induction:  IV  Informed Consent: Informed consent signed with the Patient and all parties understand the risks and agree with anesthesia plan.  All questions answered.   ASA Score: 2  Day of Surgery Review of History & Physical: H&P Update referred to the surgeon/provider.I have interviewed and examined the patient. I have reviewed the  patient's H&P dated: There are no significant changes.     Ready For Surgery From Anesthesia Perspective.     .

## 2025-01-31 VITALS
OXYGEN SATURATION: 99 % | DIASTOLIC BLOOD PRESSURE: 75 MMHG | TEMPERATURE: 98 F | SYSTOLIC BLOOD PRESSURE: 121 MMHG | BODY MASS INDEX: 28.23 KG/M2 | RESPIRATION RATE: 18 BRPM | HEART RATE: 64 BPM | WEIGHT: 220 LBS | HEIGHT: 74 IN

## 2025-01-31 LAB
FINAL PATHOLOGIC DIAGNOSIS: NORMAL
GROSS: NORMAL
Lab: NORMAL

## 2025-02-03 ENCOUNTER — TELEPHONE (OUTPATIENT)
Dept: GASTROENTEROLOGY | Facility: CLINIC | Age: 40
End: 2025-02-03
Payer: OTHER GOVERNMENT

## 2025-02-03 NOTE — TELEPHONE ENCOUNTER
----- Message from Carla sent at 2/3/2025  2:08 PM CST -----  Contact: Ruddy/Spouse  Type:  Sooner Apoointment Request    Caller is requesting a sooner appointment. Caller will not accept being placed on the waitlist and is requesting a message be sent to doctor.  Name of Caller:Ruddy  When is the first available appointment?unknown  Symptoms:Post-op visit  Would the patient rather a call back or a response via [facility]? call  Best Call Back Number:[phone]  Additional Information: Please give Mrs. Fox a call back to assist.   Thank you,  GH

## 2025-02-03 NOTE — TELEPHONE ENCOUNTER
Contacted pt in regards to Gi appt.Appt date time and location given. Pt verbalized understanding.

## 2025-02-04 NOTE — PROGRESS NOTES
Your biopsies are consistent with EoE. A new paper prescription for a different acid blocker was given to you as well as the budesonide slurry since you reported abdominal pain with Protonix. [facility] and start these medications as soon as you can. A hiatal hernia was also seen during your endoscopy. Please practice the anti-reflux measures discussed including not eating late, not over eating, not eating, lying right down and avoiding foods that can worsen your symptoms.  Hopefully the stretching of your swallowing pipe has improved your symptoms as well.    It is important that you follow up with Ms. Heidi Colon NP in the GI clinic so we can follow up and determine if you are responding to therapy. Please call [phone] to arrange your appointment.

## 2025-02-28 ENCOUNTER — TELEPHONE (OUTPATIENT)
Dept: GASTROENTEROLOGY | Facility: CLINIC | Age: 40
End: 2025-02-28
Payer: OTHER GOVERNMENT

## 2025-02-28 NOTE — TELEPHONE ENCOUNTER
[facility] a [facility], pts RX for lansoprazole 30 mg/ml is not covered.   Formulary alternate medications are : Omepraozle and pantoprazole.     Please review.

## 2025-03-06 ENCOUNTER — PATIENT MESSAGE (OUTPATIENT)
Dept: GASTROENTEROLOGY | Facility: CLINIC | Age: 40
End: 2025-03-06
Payer: OTHER GOVERNMENT

## 2025-03-10 ENCOUNTER — OFFICE VISIT (OUTPATIENT)
Dept: GASTROENTEROLOGY | Facility: CLINIC | Age: 40
End: 2025-03-10
Payer: OTHER GOVERNMENT

## 2025-03-10 VITALS
DIASTOLIC BLOOD PRESSURE: 77 MMHG | HEART RATE: 67 BPM | HEIGHT: 74 IN | BODY MASS INDEX: 27.25 KG/M2 | SYSTOLIC BLOOD PRESSURE: 124 MMHG | WEIGHT: 212.31 LBS

## 2025-03-10 DIAGNOSIS — K20.0 EOSINOPHILIC ESOPHAGITIS: Primary | ICD-10-CM

## 2025-03-10 DIAGNOSIS — K22.2 SCHATZKI'S RING: ICD-10-CM

## 2025-03-10 PROCEDURE — 99214 OFFICE O/P EST MOD 30 MIN: CPT | Mod: S$PBB,,, | Performed by: NURSE PRACTITIONER

## 2025-03-10 PROCEDURE — 99214 OFFICE O/P EST MOD 30 MIN: CPT | Mod: PBBFAC | Performed by: NURSE PRACTITIONER

## 2025-03-10 PROCEDURE — 99999 PR PBB SHADOW E&M-EST. PATIENT-LVL IV: CPT | Mod: PBBFAC,,, | Performed by: NURSE PRACTITIONER

## 2025-03-10 RX ORDER — DUPILUMAB 300 MG/2ML
300 INJECTION, SOLUTION SUBCUTANEOUS
Qty: 8 ML | Refills: 11 | Status: SHIPPED | OUTPATIENT
Start: 2025-03-10 | End: 2026-03-10

## 2025-03-10 RX ORDER — PANTOPRAZOLE SODIUM 40 MG/1
40 TABLET, DELAYED RELEASE ORAL DAILY
Qty: 90 TABLET | Refills: 3 | Status: SHIPPED | OUTPATIENT
Start: 2025-03-10 | End: 2026-03-10

## 2025-03-10 NOTE — Clinical Note
I am attempting to get this patient on Dupixent for EoE. I gave the patient the original RX to [facility] to [facility] but also wanted you aware because I'm sure we will have to do some PA or something for him to get it. I am placing a copy of the RX on your desk incase you need it.

## 2025-03-10 NOTE — PROGRESS NOTES
"Clinic Follow Up:  [facility] Follow Up Note    Reason for Follow Up:  The primary encounter diagnosis was Eosinophilic esophagitis. A diagnosis of Schatzki's ring was also pertinent to this visit.    PCP: Olamide, Primary Doctor       HPI:  This is a 39 y.o. male here for follow up of EoE.   Was placed on pantoprazole daily for EoE in 10/25515. He continued with symptoms of dysphagia and reflux. He had EGD in 1/2025 that revealed EoE (up to 32 eosinophils per HPF). Also with 6 cm hiatal hernia. Underwent dilation of Schatzki ring. After his EGD, he was changed to lansoprazole but [facility] denied medication. He continued on pantoprazole. Budesonide slurries were also added after his EGD and is taking them 1-2 times per day. He does see some improvement in symptoms but continues to not feel well controlled.     Review of Systems    Medical History:  Past Medical History:   Diagnosis Date    ADD (attention deficit disorder)     Anxiety     Insomnia     PTSD (post-traumatic stress disorder)     PTSD (post-traumatic stress disorder)        Surgical History:   Past Surgical History:   Procedure Laterality Date    CYST REMOVAL      right hand    ESOPHAGOGASTRODUODENOSCOPY N/A 2/17/2023    Procedure: EGD (ESOPHAGOGASTRODUODENOSCOPY);  Surgeon: Kriss Dorsey MD;  Location: [facility];  Service: Endoscopy;  Laterality: N/A;       Family History:   Family History   Problem Relation Name Age of Onset    Hypertension Father         Social History:   Social History[1]    Allergies:   Review of patient's allergies indicates:   Allergen Reactions    Chicken derived     Hydrocodone Other (See Comments)     Pt says he CAN take Percocet    Milk containing products (dairy)      EOE    Tramadol        Home Medications:  Medications Ordered Prior to Encounter[2]    /77 (BP Location: Left arm, Patient Position: Sitting)   Pulse 67   Ht 6' 2" (1.88 m)   Wt 96.3 kg (212 lb 4.9 oz)   BMI 27.26 kg/m²   Body " mass index is 27.26 kg/m².  Physical Exam    Labs: Pertinent labs reviewed.  CRC Screening: NA    Assessment:   1. Eosinophilic esophagitis    2. Schatzki's ring    Documented EoE treatment failure on PPI daily. Was placed on Budesonide slurries and has had some clinical improvement but not in clinical remission. Had dilation of Schatzki ring which in likely contributing to his symptom improvement.     Recommendations:   - Dupixent 300 mg weekly  - pantoprazole refilled.   - continue pantoprazole and budesonide until able to get Dupixent.     Eosinophilic esophagitis  -     dupilumab (DUPIXENT PEN) 300 mg/2 mL PnIj; Inject 2 mLs (300 mg total) into the skin every 7 days.  Dispense: 8 mL; Refill: 11  -     pantoprazole (PROTONIX) 40 MG tablet; Take 1 tablet (40 mg total) by mouth once daily.  Dispense: 90 tablet; Refill: 3    Schatzki's ring    Return to Clinic:  Follow up in about 6 months (around 9/10/2025).    Thank you for the opportunity to participate in the care of this patient.  KATHERINE Guillen             [1]   Social History  Tobacco Use    Smoking status: Never    Smokeless tobacco: Never   Substance Use Topics    Alcohol use: Not Currently    Drug use: No   [2]   Current Outpatient Medications on File Prior to Visit   Medication Sig Dispense Refill    budesonide (PULMICORT) 0.5 mg/2 mL nebulizer solution Take 2 mLs (0.5 mg total) by nebulization 2 (two) times a day. SWALLOW TWO RESPULES BY MOUTH AS DIRECTED ** MIX TWO RESPULES WITH 10 PACKS OF SPLENDA IN A SLURRY AND SWALLOW WHILE LAYING DOWN TWICE A DAY * DO NOT EAT OR DRINK FOR THIRTY MINUTES AFTER ** 360 mL 3    dextroamphetamine-amphetamine (ADDERALL XR) 20 MG 24 hr capsule Take 20 mg by mouth every morning.      [DISCONTINUED] LANSOPRAZOLE 3 MG/ML SUSP (PREVACID) Take 10 mLs (30 mg total) by mouth 2 (two) times a day. 1800 mL 3    [DISCONTINUED] testosterone cypionate (DEPOTESTOTERONE CYPIONATE) 200 mg/mL injection Inject into the muscle once a  week.       No current facility-administered medications on file prior to visit.

## 2025-03-11 ENCOUNTER — TELEPHONE (OUTPATIENT)
Dept: GASTROENTEROLOGY | Facility: CLINIC | Age: 40
End: 2025-03-11
Payer: OTHER GOVERNMENT

## 2025-03-11 NOTE — TELEPHONE ENCOUNTER
Faxed prior auth for lansoprazole to [facility] at  [phone] along with procedure note and report per Dr Dorsey.

## 2025-03-11 NOTE — TELEPHONE ENCOUNTER
Dr Alaniz reviewed pts chart and procedure. Appeal done for Prior auth with [facility].   [facility]axed to [facility] at [phone].

## 2025-03-12 ENCOUNTER — TELEPHONE (OUTPATIENT)
Dept: GASTROENTEROLOGY | Facility: CLINIC | Age: 40
End: 2025-03-12
Payer: OTHER GOVERNMENT

## 2025-03-12 ENCOUNTER — PATIENT MESSAGE (OUTPATIENT)
Dept: GASTROENTEROLOGY | Facility: CLINIC | Age: 40
End: 2025-03-12
Payer: OTHER GOVERNMENT

## 2025-03-12 NOTE — TELEPHONE ENCOUNTER
Sent Patient a message to let him know Mrs. Colon's office is closed for this afternoon and they will need to get back in touch with him tomorrow RE; fax paper.

## 2025-03-12 NOTE — TELEPHONE ENCOUNTER
----- Message from Summer sent at 3/11/2025  2:58 PM CDT -----  Contact: Ruddy/wife  Type:  Needs Medical AdviceWho Called: Ruddy/Likntoms (please be specific): dupilumab (DUPIXENT PEN) 300 mg/2 mL PnIj  How long has patient had these symptoms:  Pharmacy name and phone #:  Would the patient rather a call back or a response via [facility]? Call [facility] Call Back Number:  [phone] Information: She wants to know if you had [facility]

## 2025-03-13 ENCOUNTER — TELEPHONE (OUTPATIENT)
Dept: GASTROENTEROLOGY | Facility: CLINIC | Age: 40
End: 2025-03-13
Payer: OTHER GOVERNMENT

## 2025-03-13 NOTE — TELEPHONE ENCOUNTER
----- Message from [facility] sent at 3/13/2025  8:05 AM CDT -----  Contact: wife  ..Type:  Needs Medical AdviceWho Called: RuddyWould the patient rather a call back or a response via [facility]? Call [facility] Call Back Number: .[phone] Information: pt wife Ruddy is calling in reference to [facility] , calling due to forms needing to be filled out and [facility] the [facility].

## 2025-03-24 ENCOUNTER — TELEPHONE (OUTPATIENT)
Dept: GASTROENTEROLOGY | Facility: CLINIC | Age: 40
End: 2025-03-24
Payer: OTHER GOVERNMENT

## 2025-03-24 NOTE — TELEPHONE ENCOUNTER
He was given a prescription for pantoprazole to take every day.   Since he has failed pantoprazole and budesonide slurry, I have submitted for Dupeixent and have filled out PA forms stating what medications he has failed. We are [facility] approval from [facility] to start Dupixent.     Until he is able to get Dupixent, he will need to continue pantoprazole and budesonide slurry.

## 2025-03-24 NOTE — TELEPHONE ENCOUNTER
----- Message from Francine sent at 3/24/2025  1:18 PM CDT -----  Contact: Ruddy/wife  Type:  RX Refill RequestWho Called: Ruddy/wifeRefill or New Rx:RefillRX Name and Strength:OmeprazolHow is the patient currently taking it? (ex. 1XDay):1 x dayIs this a 30 day or 90 day RX:90 daysLocal or Mail Order:localOrdering Provider:ould the patient rather a call back or a response via [facility]? Call [facility] Call Back Number:[phone] Information: She ask for a [facility], she states the [facility] document to be fill out by  in order to get the prescription.Thanks!

## 2025-03-26 ENCOUNTER — TELEPHONE (OUTPATIENT)
Dept: GASTROENTEROLOGY | Facility: CLINIC | Age: 40
End: 2025-03-26
Payer: OTHER GOVERNMENT

## 2025-03-26 NOTE — TELEPHONE ENCOUNTER
Got a [facility] from [facility].Lansoprazole prior auth and appeal has been denied.   Ms Heidibrandon Colon was working on another PA for a different PPI-results still pending.